# Patient Record
Sex: MALE | Race: WHITE | NOT HISPANIC OR LATINO | Employment: OTHER | URBAN - METROPOLITAN AREA
[De-identification: names, ages, dates, MRNs, and addresses within clinical notes are randomized per-mention and may not be internally consistent; named-entity substitution may affect disease eponyms.]

---

## 2021-01-11 ENCOUNTER — OFFICE VISIT (OUTPATIENT)
Dept: INTERNAL MEDICINE | Facility: CLINIC | Age: 61
End: 2021-01-11

## 2021-01-11 VITALS
BODY MASS INDEX: 21.45 KG/M2 | HEART RATE: 84 BPM | DIASTOLIC BLOOD PRESSURE: 92 MMHG | TEMPERATURE: 96.9 F | SYSTOLIC BLOOD PRESSURE: 130 MMHG | WEIGHT: 158.4 LBS | HEIGHT: 72 IN

## 2021-01-11 DIAGNOSIS — Z13.220 LIPID SCREENING: ICD-10-CM

## 2021-01-11 DIAGNOSIS — Z11.59 NEED FOR HEPATITIS C SCREENING TEST: ICD-10-CM

## 2021-01-11 DIAGNOSIS — R97.20 ELEVATED PSA: ICD-10-CM

## 2021-01-11 DIAGNOSIS — Z86.010 HISTORY OF COLON POLYPS: ICD-10-CM

## 2021-01-11 DIAGNOSIS — E89.0 POSTSURGICAL HYPOTHYROIDISM: Primary | ICD-10-CM

## 2021-01-11 DIAGNOSIS — R03.0 ELEVATED BLOOD PRESSURE READING: ICD-10-CM

## 2021-01-11 DIAGNOSIS — Z85.850 HISTORY OF THYROID CANCER: ICD-10-CM

## 2021-01-11 DIAGNOSIS — Z86.79 HISTORY OF ATRIAL FIBRILLATION: ICD-10-CM

## 2021-01-11 PROBLEM — Z86.0100 HISTORY OF COLON POLYPS: Status: ACTIVE | Noted: 2021-01-11

## 2021-01-11 PROCEDURE — 99204 OFFICE O/P NEW MOD 45 MIN: CPT | Performed by: STUDENT IN AN ORGANIZED HEALTH CARE EDUCATION/TRAINING PROGRAM

## 2021-01-11 RX ORDER — LEVOTHYROXINE SODIUM 88 UG/1
88 TABLET ORAL DAILY
Qty: 90 TABLET | Refills: 3 | Status: SHIPPED | OUTPATIENT
Start: 2021-01-11 | End: 2022-03-21

## 2021-01-11 RX ORDER — LEVOTHYROXINE SODIUM 88 UG/1
88 TABLET ORAL DAILY
COMMUNITY
End: 2021-01-11 | Stop reason: SDUPTHER

## 2021-01-11 RX ORDER — MULTIVIT WITH MINERALS/LUTEIN
250 TABLET ORAL DAILY
COMMUNITY

## 2021-01-11 NOTE — PROGRESS NOTES
History of Present Illness  Salazar Alex is a 60 y.o. male presenting for Elevated PSA (establish care ) and Hypothyroidism.     Patient is here to [facility], recently moved from [place], [place].  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  They have a daughter (born 1990) in [place] who just had their first grandchild in August.  They also have a son (born 1987) in [place], [place].    Patient has a history of mildly elevated PSA, October 2021 5.0, December 2020 at 5.6.  He has establish care with urologist here in [place] and they are talking about doing biopsies.    His mother is still alive in her 80s, had colitis in her younger days and developed colon cancer in her 40s.  For this reason patient has had multiple colonoscopies, most recently about a year ago, which was normal.  I recommend a repeat colonoscopy in 5 years.  On his colonoscopy about 8 years ago he had a couple of small polyps, that were removed.    Patient has a history of right hemithyroidectomy October 2010 for papillary thyroid cancer.  No lymph node involvement or metastasis.  He is on Synthroid 0.88 mcg daily and has been on for years.  He has just been following with his PCP in [place] over the last 5 years with no concern for recurrence.    Patient also has a history of atrial fibrillation, status post ablation December 2018.  He did not have recurrence of his A. fib since.  Not on any medications for this.    The following portions of the patient's history were reviewed and updated as appropriate: allergies, current medications, past family history, past medical history, past social history, past surgical history and problem list.    Review of Systems   Constitutional: Negative for fever.   HENT: Negative for congestion.    Eyes: Negative for visual disturbance.   Respiratory: Negative for shortness of breath.    Cardiovascular: Negative for chest pain.   Gastrointestinal: Negative  "for abdominal pain.   Genitourinary: Negative for dysuria.   Skin: Negative for rash.   Neurological: Negative for weakness.   Psychiatric/Behavioral: Negative for depressed mood.       Objective  /92 (BP Location: Right arm, Patient Position: Sitting, Cuff Size: Adult)   Pulse 84   Temp 96.9 °F (36.1 °C) (Infrared)   Ht 182.9 cm (72\")   Wt 71.8 kg (158 lb 6.4 oz)   BMI 21.48 kg/m²     Physical Exam  Vitals signs reviewed.   Constitutional:       Appearance: Normal appearance.   HENT:      Head: Normocephalic and atraumatic.      Nose: Nose normal. No congestion.      Mouth/Throat:      Mouth: Mucous membranes are moist.   Eyes:      Extraocular Movements: Extraocular movements intact.      Conjunctiva/sclera: Conjunctivae normal.   Neck:      Musculoskeletal: Neck supple.      Comments: No lymphadenopathy of the neck or supraclavicular area.  Left thyroid without nodularities.  Cardiovascular:      Rate and Rhythm: Normal rate and regular rhythm.      Heart sounds: Normal heart sounds. No murmur.   Pulmonary:      Effort: Pulmonary effort is normal.      Breath sounds: Normal breath sounds.   Abdominal:      General: There is no distension.      Palpations: Abdomen is soft. There is no mass.      Tenderness: There is no abdominal tenderness.   Musculoskeletal:      Right lower leg: No edema.      Left lower leg: No edema.   Lymphadenopathy:      Cervical: No cervical adenopathy.   Skin:     General: Skin is warm and dry.   Neurological:      Mental Status: He is alert and oriented to person, place, and time. Mental status is at baseline.   Psychiatric:         Behavior: Behavior normal.         Thought Content: Thought content normal.         Assessment/Plan   1. Postsurgical hypothyroidism  2. History of thyroid cancer  Status post surgery in 2010.  Will check TSH today.  Continue on levothyroxine.  No clinical concern for recurrence.  Recheck TSH every 6 to 12 months depending on results.  - " levothyroxine (Synthroid) 88 MCG tablet; Take 1 tablet by mouth Daily.  Dispense: 90 tablet; Refill: 3  - TSH; Future  - T4, Free; Future      3. Elevated blood pressure reading  BP Readings from Last 3 Encounters:   01/11/21 130/92   Borderline elevated today.  No history of hypertension.  Will recheck on follow-up visit.  - Comprehensive Metabolic Panel; Future    4. History of atrial fibrillation  No palpitations.  Patient is physically active and states he did not need to follow-up with cardiology.  Regular rate today.    5. Elevated PSA  Last PSA 5.6 per patient.  He will follow up with urology.    6. History of colon polyps  Last colonoscopy about a year ago, will attempt to get records.    7. Need for hepatitis C screening test  No history of hepatitis C.  Will collect today as per recommendations.  - Hepatitis C Antibody; Future    8. Lipid screening  Collected today, not fasting.  - Lipid Panel; Future      Return in about 3 months (around 4/11/2021) for Annual physical + recheck BP.    Olegario Lam MD  Family Medicine  01/11/2021    Note: Speech recognition transcription software may have been used to create portions of this document.  An attempt at proofreading has been made but errors in transcription could still be present.

## 2021-01-14 ENCOUNTER — LAB (OUTPATIENT)
Dept: LAB | Facility: HOSPITAL | Age: 61
End: 2021-01-14

## 2021-01-14 DIAGNOSIS — Z11.59 NEED FOR HEPATITIS C SCREENING TEST: ICD-10-CM

## 2021-01-14 DIAGNOSIS — Z13.220 LIPID SCREENING: ICD-10-CM

## 2021-01-14 DIAGNOSIS — E89.0 POSTSURGICAL HYPOTHYROIDISM: ICD-10-CM

## 2021-01-14 DIAGNOSIS — R03.0 ELEVATED BLOOD PRESSURE READING: ICD-10-CM

## 2021-01-14 LAB
ALBUMIN SERPL-MCNC: 4.5 G/DL (ref 3.5–5.2)
ALBUMIN/GLOB SERPL: 1.6 G/DL
ALP SERPL-CCNC: 61 U/L (ref 39–117)
ALT SERPL W P-5'-P-CCNC: 27 U/L (ref 1–41)
ANION GAP SERPL CALCULATED.3IONS-SCNC: 9.1 MMOL/L (ref 5–15)
AST SERPL-CCNC: 26 U/L (ref 1–40)
BILIRUB SERPL-MCNC: 0.7 MG/DL (ref 0–1.2)
BUN SERPL-MCNC: 19 MG/DL (ref 6–20)
BUN/CREAT SERPL: 22.4 (ref 7–25)
CALCIUM SPEC-SCNC: 9.2 MG/DL (ref 8.6–10.5)
CHLORIDE SERPL-SCNC: 104 MMOL/L (ref 98–107)
CHOLEST SERPL-MCNC: 193 MG/DL (ref 0–200)
CO2 SERPL-SCNC: 25.9 MMOL/L (ref 22–29)
CREAT SERPL-MCNC: 0.85 MG/DL (ref 0.76–1.27)
GFR SERPL CREATININE-BSD FRML MDRD: 92 ML/MIN/1.73
GLOBULIN UR ELPH-MCNC: 2.8 GM/DL
GLUCOSE SERPL-MCNC: 88 MG/DL (ref 65–99)
HCV AB SER DONR QL: NORMAL
HDLC SERPL-MCNC: 62 MG/DL (ref 40–60)
LDLC SERPL CALC-MCNC: 121 MG/DL (ref 0–100)
LDLC/HDLC SERPL: 1.93 {RATIO}
POTASSIUM SERPL-SCNC: 4.1 MMOL/L (ref 3.5–5.2)
PROT SERPL-MCNC: 7.3 G/DL (ref 6–8.5)
SODIUM SERPL-SCNC: 139 MMOL/L (ref 136–145)
T4 FREE SERPL-MCNC: 1.41 NG/DL (ref 0.93–1.7)
TRIGL SERPL-MCNC: 56 MG/DL (ref 0–150)
TSH SERPL DL<=0.05 MIU/L-ACNC: 3.63 UIU/ML (ref 0.27–4.2)
VLDLC SERPL-MCNC: 10 MG/DL (ref 5–40)

## 2021-01-14 PROCEDURE — 80053 COMPREHEN METABOLIC PANEL: CPT

## 2021-01-14 PROCEDURE — 84443 ASSAY THYROID STIM HORMONE: CPT

## 2021-01-14 PROCEDURE — 86803 HEPATITIS C AB TEST: CPT

## 2021-01-14 PROCEDURE — 84439 ASSAY OF FREE THYROXINE: CPT

## 2021-01-14 PROCEDURE — 80061 LIPID PANEL: CPT

## 2021-04-27 ENCOUNTER — OFFICE VISIT (OUTPATIENT)
Dept: INTERNAL MEDICINE | Facility: CLINIC | Age: 61
End: 2021-04-27

## 2021-04-27 VITALS
DIASTOLIC BLOOD PRESSURE: 88 MMHG | WEIGHT: 155.6 LBS | HEIGHT: 72 IN | OXYGEN SATURATION: 95 % | SYSTOLIC BLOOD PRESSURE: 122 MMHG | HEART RATE: 64 BPM | BODY MASS INDEX: 21.08 KG/M2 | TEMPERATURE: 98.2 F

## 2021-04-27 DIAGNOSIS — G89.29 CHRONIC PAIN OF RIGHT KNEE: ICD-10-CM

## 2021-04-27 DIAGNOSIS — M25.571 CHRONIC PAIN OF RIGHT ANKLE: ICD-10-CM

## 2021-04-27 DIAGNOSIS — N39.3 STRESS INCONTINENCE OF URINE: ICD-10-CM

## 2021-04-27 DIAGNOSIS — R97.20 ELEVATED PSA: ICD-10-CM

## 2021-04-27 DIAGNOSIS — L85.3 DRY SKIN: ICD-10-CM

## 2021-04-27 DIAGNOSIS — Z00.00 WELL ADULT EXAM: Primary | ICD-10-CM

## 2021-04-27 DIAGNOSIS — G89.29 CHRONIC PAIN OF RIGHT ANKLE: ICD-10-CM

## 2021-04-27 DIAGNOSIS — M25.561 CHRONIC PAIN OF RIGHT KNEE: ICD-10-CM

## 2021-04-27 DIAGNOSIS — Z90.79 H/O ABDOMINAL PROSTATECTOMY: ICD-10-CM

## 2021-04-27 PROBLEM — N40.0 BENIGN PROSTATIC HYPERPLASIA: Status: ACTIVE | Noted: 2019-07-03

## 2021-04-27 PROBLEM — H26.9 CATARACT: Status: ACTIVE | Noted: 2018-05-09

## 2021-04-27 PROCEDURE — 99396 PREV VISIT EST AGE 40-64: CPT | Performed by: STUDENT IN AN ORGANIZED HEALTH CARE EDUCATION/TRAINING PROGRAM

## 2021-04-27 NOTE — PATIENT INSTRUCTIONS
"https://www.nhlbi.nih.gov/files/docs/public/heart/dash_brief.pdf\">   DASH Eating Plan  DASH stands for Dietary Approaches to Stop Hypertension. The DASH eating plan is a healthy eating plan that has been shown to:  · Reduce high blood pressure (hypertension).  · Reduce your risk for type 2 diabetes, heart disease, and stroke.  · Help with weight loss.  What are tips for following this plan?  Reading food labels  · Check food labels for the amount of salt (sodium) per serving. Choose foods with less than 5 percent of the Daily Value of sodium. Generally, foods with less than 300 milligrams (mg) of sodium per serving fit into this eating plan.  · To find whole grains, look for the word \"whole\" as the first word in the ingredient list.  Shopping  · Buy products labeled as \"low-sodium\" or \"no salt added.\"  · Buy fresh foods. Avoid canned foods and pre-made or frozen meals.  Cooking  · Avoid adding salt when cooking. Use salt-free seasonings or herbs instead of table salt or sea salt. Check with your health care provider or pharmacist before using salt substitutes.  · Do not johnson foods. Cook foods using healthy methods such as baking, boiling, grilling, roasting, and broiling instead.  · Cook with heart-healthy oils, such as olive, canola, avocado, soybean, or sunflower oil.  Meal planning    · Eat a balanced diet that includes:  ? 4 or more servings of fruits and 4 or more servings of vegetables each day. Try to fill one-half of your plate with fruits and vegetables.  ? 6-8 servings of whole grains each day.  ? Less than 6 oz (170 g) of lean meat, poultry, or fish each day. A 3-oz (85-g) serving of meat is about the same size as a deck of cards. One egg equals 1 oz (28 g).  ? 2-3 servings of low-fat dairy each day. One serving is 1 cup (237 mL).  ? 1 serving of nuts, seeds, or beans 5 times each week.  ? 2-3 servings of heart-healthy fats. Healthy fats called omega-3 fatty acids are found in foods such as walnuts, " flaxseeds, fortified milks, and eggs. These fats are also found in cold-water fish, such as sardines, salmon, and mackerel.  · Limit how much you eat of:  ? Canned or prepackaged foods.  ? Food that is high in trans fat, such as some fried foods.  ? Food that is high in saturated fat, such as fatty meat.  ? Desserts and other sweets, sugary drinks, and other foods with added sugar.  ? Full-fat dairy products.  · Do not salt foods before eating.  · Do not eat more than 4 egg yolks a week.  · Try to eat at least 2 vegetarian meals a week.  · Eat more home-cooked food and less restaurant, buffet, and fast food.  Lifestyle  · When eating at a restaurant, ask that your food be prepared with less salt or no salt, if possible.  · If you drink alcohol:  ? Limit how much you use to:  § 0-1 drink a day for women who are not pregnant.  § 0-2 drinks a day for men.  ? Be aware of how much alcohol is in your drink. In the [place] one drink equals one 12 oz bottle of beer (355 mL), one 5 oz glass of wine (148 mL), or one 1½ oz glass of hard liquor (44 mL).  General information  · Avoid eating more than 2,300 mg of salt a day. If you have hypertension, you may need to reduce your sodium intake to 1,500 mg a day.  · Work with your health care provider to maintain a healthy body weight or to lose weight. Ask what an ideal weight is for you.  · Get at least 30 minutes of exercise that causes your heart to beat faster (aerobic exercise) most days of the week. Activities may include walking, swimming, or biking.  · Work with your health care provider or dietitian to adjust your eating plan to your individual calorie needs.  What foods should I eat?  Fruits  All fresh, dried, or frozen fruit. Canned fruit in natural juice (without added sugar).  Vegetables  Fresh or frozen vegetables (raw, steamed, roasted, or grilled). Low-sodium or reduced-sodium tomato and vegetable juice. Low-sodium or reduced-sodium tomato sauce and tomato paste.  Low-sodium or reduced-sodium canned vegetables.  Grains  Whole-grain or whole-wheat bread. Whole-grain or whole-wheat pasta. Brown rice. Oatmeal. Quinoa. Bulgur. Whole-grain and low-sodium cereals. Ani bread. Low-fat, low-sodium crackers. Whole-wheat flour tortillas.  Meats and other proteins  Skinless chicken or turkey. Ground chicken or turkey. Pork with fat trimmed off. Fish and seafood. Egg whites. Dried beans, peas, or lentils. Unsalted nuts, nut butters, and seeds. Unsalted canned beans. Lean cuts of beef with fat trimmed off. Low-sodium, lean precooked or cured meat, such as sausages or meat loaves.  Dairy  Low-fat (1%) or fat-free (skim) milk. Reduced-fat, low-fat, or fat-free cheeses. Nonfat, low-sodium ricotta or cottage cheese. Low-fat or nonfat yogurt. Low-fat, low-sodium cheese.  Fats and oils  Soft margarine without trans fats. Vegetable oil. Reduced-fat, low-fat, or light mayonnaise and salad dressings (reduced-sodium). Canola, safflower, olive, avocado, soybean, and sunflower oils. Avocado.  Seasonings and condiments  Herbs. Spices. Seasoning mixes without salt.  Other foods  Unsalted popcorn and pretzels. Fat-free sweets.  The items listed above may not be a complete list of foods and beverages you can eat. Contact a dietitian for more information.  What foods should I avoid?  Fruits  Canned fruit in a light or heavy syrup. Fried fruit. Fruit in cream or butter sauce.  Vegetables  Creamed or fried vegetables. Vegetables in a cheese sauce. Regular canned vegetables (not low-sodium or reduced-sodium). Regular canned tomato sauce and paste (not low-sodium or reduced-sodium). Regular tomato and vegetable juice (not low-sodium or reduced-sodium). Pickles. Olives.  Grains  Baked goods made with fat, such as croissants, muffins, or some breads. Dry pasta or rice meal packs.  Meats and other proteins  Fatty cuts of meat. Ribs. Fried meat. Griffin. Bologna, salami, and other precooked or cured meats, such as  sausages or meat loaves. Fat from the back of a pig (fatback). Bratwurst. Salted nuts and seeds. Canned beans with added salt. Canned or smoked fish. Whole eggs or egg yolks. Chicken or turkey with skin.  Dairy  Whole or 2% milk, cream, and half-and-half. Whole or full-fat cream cheese. Whole-fat or sweetened yogurt. Full-fat cheese. Nondairy creamers. Whipped toppings. Processed cheese and cheese spreads.  Fats and oils  Butter. Stick margarine. Lard. Shortening. Ghee. Griffin fat. Tropical oils, such as coconut, palm kernel, or palm oil.  Seasonings and condiments  Onion salt, garlic salt, seasoned salt, table salt, and sea salt. Worcestershire sauce. Tartar sauce. Barbecue sauce. Teriyaki sauce. Soy sauce, including reduced-sodium. Steak sauce. Canned and packaged gravies. Fish sauce. Oyster sauce. Cocktail sauce. Store-bought horseradish. Ketchup. Mustard. Meat flavorings and tenderizers. Bouillon cubes. Hot sauces. Pre-made or packaged marinades. Pre-made or packaged taco seasonings. Relishes. Regular salad dressings.  Other foods  Salted popcorn and pretzels.  The items listed above may not be a complete list of foods and beverages you should avoid. Contact a dietitian for more information.  Where to find more information  · National Heart, Lung, and Blood [place]: www.nhlbi.nih.gov  · American Heart Association: www.heart.org  · Academy of Nutrition and Dietetics: www.eatright.org  · National Kidney Foundation: www.kidney.org  Summary  · The DASH eating plan is a healthy eating plan that has been shown to reduce high blood pressure (hypertension). It may also reduce your risk for type 2 diabetes, heart disease, and stroke.  · When on the DASH eating plan, aim to eat more fresh fruits and vegetables, whole grains, lean proteins, low-fat dairy, and heart-healthy fats.  · With the DASH eating plan, you should limit salt (sodium) intake to 2,300 mg a day. If you have hypertension, you may need to reduce your  sodium intake to 1,500 mg a day.  · Work with your health care provider or dietitian to adjust your eating plan to your individual calorie needs.  This information is not intended to replace advice given to you by your health care provider. Make sure you discuss any questions you have with your health care provider.  Document Revised: 11/20/2020 Document Reviewed: 11/20/2020  [employer] Patient Education © 2021 [employer]

## 2021-04-27 NOTE — PROGRESS NOTES
"Chief Complaint  Salazar Alex is a 60 y.o. male presenting for Annual Exam (fasting ).     Patient has a past medical history of thyroid cancer status post thyroidectomy, IBS, colon polyp, atrial fibrillation status post ablation, BPH and elevated PSA, and he underwent abdominal laparoscopic prostatectomy 4/2021, biopsy results pending.    History of Present Illness  Patient is here for annual physical.    He had his prostate surgery at [facility] on 4/6/2021.  He has significant daytime urinary incontinence, but he is able to sleep at night without concern for urinary leakage.  He has upcoming appointment with urology and biopsy results are still pending.    He has noticed some dryness of both feet, with mild itching and thickening of the skin of the toes.  He does sweat in his socks and tends to be moist at times.  He does not apply any moisturizers.    He also has noticed some right ankle discomfort over the last 2 years, gets worse when he is active.  No injury.  Additionally he did have a right knee meniscus tear in the past and he had arthroscopy done in 2014 with shaving of his meniscus.  He has noticed some discomfort recently when he is exercising, but no swelling of the joint.    The following portions of the patient's history were reviewed and updated as appropriate: allergies, current medications, past family history, past medical history, past social history, past surgical history and problem list.    Review of Systems   Gastrointestinal: Positive for abdominal pain.   Genitourinary: Positive for dysuria.       Objective  /88 (BP Location: Left arm, Patient Position: Sitting, Cuff Size: Adult)   Pulse 64   Temp 98.2 °F (36.8 °C) (Temporal)   Ht 182.9 cm (72.01\")   Wt 70.6 kg (155 lb 9.6 oz)   SpO2 95%   BMI 21.10 kg/m²     Physical Exam  Vitals reviewed.   Constitutional:       Appearance: Normal appearance. He is normal weight.   HENT:      Head: Normocephalic and atraumatic.      " Nose: No congestion.   Eyes:      Extraocular Movements: Extraocular movements intact.      Conjunctiva/sclera: Conjunctivae normal.   Neck:      Vascular: No carotid bruit.   Cardiovascular:      Rate and Rhythm: Normal rate and regular rhythm.      Heart sounds: Normal heart sounds. No murmur heard.     Pulmonary:      Effort: Pulmonary effort is normal.      Breath sounds: Normal breath sounds.   Abdominal:      General: There is no distension.      Palpations: Abdomen is soft. There is no mass.      Tenderness: There is abdominal tenderness. There is no guarding.      Comments: Well-healing scars on his abdomen.  Mildly tender, no focal tenderness.   Musculoskeletal:         General: No swelling or tenderness.      Cervical back: Neck supple.      Comments: Right knee: Nontender on palpation.  Good ROM.  Nontender on valgus and varus stress, Lachman and drawer test negative.  No crepitus.  Right ankle: Nontender on palpation.  Nontender ligaments.  Good ROM without tenderness or crepitus.   Skin:     General: Skin is warm and dry.      Comments: Dry skin on both feet over the dorsal toes and distal forefoot.   Neurological:      Mental Status: He is alert and oriented to person, place, and time. Mental status is at baseline.   Psychiatric:         Behavior: Behavior normal.         Thought Content: Thought content normal.         Assessment/Plan   1. Well adult exam  Patient's Body mass index is 21.1 kg/m². BMI is within normal parameters. No follow-up required..    2. H/O abdominal prostatectomy  3. Elevated PSA  4. Stress incontinence of urine  Currently having significant incontinence, patient is interested in physical therapy and referral.  Biopsy still pending.  Will likely improve with pelvic floor exercises.  Patient will follow up with urology.  - Ambulatory Referral to Physical Therapy Pelvic Floor    5. Dry skin  Likely caused by moist skin in setting of sweating.  Counseled on use of footwear with  ventilation, avoid walking around and moist or wet feet.  Recommend using moisturizers at night before he goes to bed.    6. Chronic pain of right ankle  Mild, discussed x-ray versus physical therapy.  Patient would like to hold off for now.  Possible degenerative.  Recommend activity, strengthening exercises, can also try balance forward.    7. Chronic pain of right knee  Possibility of OA, could be secondary after his previous knee injury.  Discussed x-ray, he would like to hold off for now.  Recommend continued physical activity, consider physical therapy and x-ray if worsening.      Return in about 4 months (around 8/27/2021) for Recheck.    Future Appointments       Provider Department Center    8/27/2021 9:15 AM Olegario Lam MD [facility] INTERNAL MEDICINE [facility]            Olegario Lam MD  Family Medicine  04/27/2021    Note: Speech recognition transcription software may have been used to create portions of this document.  An attempt at proofreading has been made but errors in transcription could still be present.

## 2021-05-03 ENCOUNTER — TREATMENT (OUTPATIENT)
Dept: PHYSICAL THERAPY | Facility: CLINIC | Age: 61
End: 2021-05-03

## 2021-05-03 DIAGNOSIS — Z90.79 H/O ABDOMINAL PROSTATECTOMY: ICD-10-CM

## 2021-05-03 DIAGNOSIS — N39.3 STRESS INCONTINENCE: Primary | ICD-10-CM

## 2021-05-03 DIAGNOSIS — M62.89 PELVIC FLOOR DYSFUNCTION: ICD-10-CM

## 2021-05-03 PROCEDURE — 97162 PT EVAL MOD COMPLEX 30 MIN: CPT | Performed by: PHYSICAL THERAPIST

## 2021-05-03 PROCEDURE — 97112 NEUROMUSCULAR REEDUCATION: CPT | Performed by: PHYSICAL THERAPIST

## 2021-05-03 NOTE — PROGRESS NOTES
Physical Therapy Initial Evaluation and Plan of Care    Patient: Salazar Alex   : 1960  Diagnosis/ICD-10 Code:  Stress incontinence [N39.3]  Referring practitioner: Olegario Lam MD  Date of Initial Visit: 5/3/2021  Today's Date: 2021  Patient seen for 1 sessions      Subjective    Initially diagnosed last summer. Got delayed because living in [place] at the time and moved here. Was supposed to have biopsy in October but had in 2021. Had surgery 2021. Catheter in for 13 days. Irritated urethra. Having urinary leakage moving around, exercise. In last 48 hours starting to get better. Active person likes to walk and biking. Lying down not leaking and not wearing a pad at night. Leaks more as day goes on.       Chief Complaint:   Chief Complaint   Patient presents with   • Initial Evaluation      Functional Outcome Measure: IIQ:     Pain  Denies pain    Bladder function:    Incontinence: yes  # of pads in 24 hours: Can saturate a pad in an hour. Using 4-5 pads per day.    How soaked is pad when changed: can be 100%  What specifically makes you leak: movement, exercise  Leak at night: no  Urination at night: 1 time  Use bathroom “just in case”: no  Strong urinary urge: no  Leaking with urge: no  Frequency of urination: 6x/day  Discomfort with urination: no      Bowel function:  More urine leakage with bowel movement and occasional constipation; otherwise denies issues.   How many episodes of leakage/month: 0  How many BM's/day: 1-2  [place] Stool Scale Type: 2-4  Discomfort with BM: no  Complete bowel voiding %: 100  Assistance to pass stool: no    Sexual activity  Sexually active: ED      Diagnostics:    PMH:   Past Medical History:   Diagnosis Date   • Adenocarcinoma of prostate (CMS/HCC)     Dec 2020 PSA 5.6. Robot assisted lap prostatectomy 2021. Rolando 4+3=7   • Elevated PSA     Dec 2020 PSA 5.6. Urology follow up   • H/O abdominal prostatectomy 2021    [facility]   • History  of atrial fibrillation     S/p ablation   • History of colon polyps 1/11/2021    Per patient ~2012. Normal colonoscopy 2020 per patient. Records requested   • History of thyroid cancer 2010    R hemithyreoidectomy. No LN or metastasis per patient   • Postsurgical hypothyroidism     R hemithyreoidectomy 2010 - papillary thyroid cancer        Surgical HX:   Past Surgical History:   Procedure Laterality Date   • CARDIAC ABLATION  12/2018    a-fib    in [facility]   • HERNIA REPAIR  03/1966   • PROSTATECTOMY  04/06/2021    Laparoscopic   • THYROIDECTOMY, PARTIAL Right 10/2010    Pappilary thyroid cancer        Meds:   Current Outpatient Medications:   •  levothyroxine (Synthroid) 88 MCG tablet, Take 1 tablet by mouth Daily., Disp: 90 tablet, Rfl: 3  •  vitamin C (ASCORBIC ACID) 250 MG tablet, Take 250 mg by mouth Daily., Disp: , Rfl:   •  vitamin E 100 UNIT capsule, Take 100 Units by mouth Daily., Disp: , Rfl:      Occupation: retired    Activity level/exercise routine: cycling and walking, occasional jogging              Objective      verbal consent obtained for external pelvic exam/treatment with declined need for second person in room    Palpation:   Supine:   Abdominals - minimal substitution  Incisions well healed    External:   Pelvic floor contraction - initially demonstrates moderate substitution of glutes, improved isolation with verbal cues  Sensation: intact B  Anal wink: intact    sEMG: supine avg 0.8, avg 5.4, max 13.3    See flowsheet for details of treatment following evaluation.         Assessment/Plan:     The patient is a 60 y.o. male who presents to physical therapy today for urinary incontinence s/p abdominal prostatectomy. Upon initial evaluation, the patient demonstrates the following impairments: decreased recruitment and endurance of pelvic floor mm with moderate glute substitution. Due to these impairments, the patient is unable to perform daily activities or exercise without urinary  incontinence. The patient would benefit from skilled pelvic PT services to address functional limitations and impairments and to improve patient quality of life.      Goals:   STG's: 4 weeks  · Patient will report using no more than 2-3 pads per day <50% wet for progress toward LTG  · Patient will report > 25% improvement in urinary symptoms  · Patient will be able to perform HEP with minimal verbal cues    LTG's: By discharge  · Patient will report >75% improvement in urinary symptoms   · Patient will be able to eliminate pad usage for improved QOL  · Patient will decrease voiding frequency to once every 3-4 hours  · Patient will be independent with HEP      Plan  Therapy options: will be seen for skilled physical therapy services  Planned modality interventions: TENS, ultrasound, cryotherapy, thermotherapy (hydrocollator packs) and high voltage pulsed current (pain management)  Planned therapy interventions: abdominal trunk stabilization, manual therapy, neuromuscular re-education, body mechanics training, flexibility, functional ROM exercises, gait training, home exercise program, joint mobilization, therapeutic activities, stretching, strengthening, spinal/joint mobilization, soft tissue mobilization and postural training  Pt prognosis: good  Frequency: weekly  Duration in visits: 7  Duration in weeks: 12  Treatment plan discussed with: patient    1230/1315  Timed:         Manual Therapy:    0     mins  05091;     Therapeutic Exercise:    4     mins  91770;     Neuromuscular Jamil:    8    mins  12402;    Therapeutic Activity:     0     mins  18953;     Gait Trainin     mins  68567;     Ultrasound:     0     mins  66287;    Ionto                               0    mins   32194  Self Care                       0     mins   71406  Canalith Repos    0     mins 30915      Un-Timed:  Electrical Stimulation:    0     mins  48039 ( );  Dry Needling     0     mins self-pay  Traction     0     mins  57339  Low Eval     0     Mins  76879  Mod Eval     33     Mins  11469  High Eval                       0     Mins  69862  Re-Eval                           0    mins  95765        Timed Treatment:   12   mins   Total Treatment:     45   mins    PT SIGNATURE:Jovi Pedro PT, DPT  DATE TREATMENT INITIATED: 05/03/2021    Initial Certification  Certification Period: 8/2/2021  I certify that the therapy services are furnished while this patient is under my care.  The services outlined above are required by this patient, and will be reviewed every 90 days.     PHYSICIAN: Olegario Lam MD      DATE: 05/03/2021     Please sign and return via fax to [phone]. Thank you, [facility].

## 2021-05-03 NOTE — PROGRESS NOTES
Physical Therapy Initial Evaluation and Plan of Care    Patient: Salazar Alex   : 1960  Diagnosis/ICD-10 Code:  No primary diagnosis found.  Referring practitioner: Olegario Lam MD  Date of Initial Visit: 5/3/2021  Today's Date: 5/3/2021  Patient seen for Visit count could not be calculated. Make sure you are using a visit which is associated with an episode. sessions      Subjective      Chief Complaint: No chief complaint on file.     Functional Outcome Measure:     Pain  Average:{Pelvic #:68053:::1} Best: {Pelvic #:58717:::1} Worst: {Pelvic #:58146:::1}  Location: ***  Aggs: {AG:::1}    Bladder function:    Incontinence: ***  # of pads in 24 hours: ***   How soaked is pad when changed: ***  What specifically makes you leak: ***  Leak at night: ***  Urination at night: ***  Use bathroom “just in case”: ***  Strong urinary urge: ***  Leaking with urge: ***  Urge triggers: ***  Complete bladder voiding %: ***  Urinary splaying: ***  Post-micturition dribble: ***  Frequency of urination: ***  Discomfort with urination: ***  Vaginal or anal itching: ***  Fluid irritants consumed daily: ***  Food irritants consumed daily: ***    Bowel function:    How many episodes of leakage/month: ***  How many BM's/day: ***  [place] Stool Scale Type: ***  Discomfort with BM: ***  Complete bowel voiding %: ***  Assistance to pass stool: ***  Diet: ***  Incontinence at night: ***  Incontinence with gas: ***  Ability to pass gas: ***    Sexual activity  Sexually active: ***  Pain with penetration: {Pain:53155:::1} Type: *** Location: ***  Pain after sex: {Pain:41302:::1} Type: *** Location: ***  Lubrication: ***  Positions of comfort & discomfort: ***  ED: ***    Prior PT or other treatment: ***    Diagnostics:    PMH:   Past Medical History:   Diagnosis Date   • Adenocarcinoma of prostate (CMS/HCC)     Dec 2020 PSA 5.6. Robot assisted lap prostatectomy 2021. Rolando 4+3=7   • Elevated PSA     Dec 2020 PSA 5.6.  Urology follow up   • H/O abdominal prostatectomy 04/06/2021    [facility]   • History of atrial fibrillation     S/p ablation   • History of colon polyps 1/11/2021    Per patient ~2012. Normal colonoscopy 2020 per patient. Records requested   • History of thyroid cancer 2010    R hemithyreoidectomy. No LN or metastasis per patient   • Postsurgical hypothyroidism     R hemithyreoidectomy 2010 - papillary thyroid cancer        Surgical HX:   Past Surgical History:   Procedure Laterality Date   • CARDIAC ABLATION  12/2018    a-fib    in [facility]   • HERNIA REPAIR  03/1966   • PROSTATECTOMY  04/06/2021    Laparoscopic   • THYROIDECTOMY, PARTIAL Right 10/2010    Pappilary thyroid cancer        Menstrual cycle: ***    Birth HX (#, when, type of delivery, complications): ***    Meds:   Current Outpatient Medications:   •  levothyroxine (Synthroid) 88 MCG tablet, Take 1 tablet by mouth Daily., Disp: 90 tablet, Rfl: 3  •  vitamin C (ASCORBIC ACID) 250 MG tablet, Take 250 mg by mouth Daily., Disp: , Rfl:   •  vitamin E 100 UNIT capsule, Take 100 Units by mouth Daily., Disp: , Rfl:      Occupation: ***    Activity level/exercise routine: ***    Diet/Food intake: ***          Objective      verbal consent obtained for internal pelvic exam/treatment with declined need for second person in room    Palpation:   Supine:   Abdominals, Iliacus - ***  Adductors ***    External:    Ischiocavernosus   Supf and deep transverse perineal mm   Perineal body   Levator ani    Internal:   Sensation: ***  Bulge: ***  Knack: ***  Wall Laxity: ***     L/R supf mm: ***   Internal supf perineal body:***   Levator ani: ***   Obturator internus: ***   Compressor urethra: ***    PERF SCALE:  Power: ***    Endurance: ***    Repetitions: ***    Fast twitch: ***        Assessment/Plan:     The patient is a 60 y.o. male who presents to physical therapy today for ***. Upon initial evaluation, the patient demonstrates the following impairments: ***.  Due to these impairments, the patient is unable to ***. The patient would benefit from skilled pelvic PT services to address functional limitations and impairments and to improve patient quality of life.      Goals:   STG's: *** weeks  · Patient will report > ***% reduction in overall pain   · Patient will report > ***% improvement in urinary symptoms  · Patient will be able to perform HEP with minimal verbal cues    LTG's: By discharge  · Patient will report a decrease in pain to < ***/10  · Patient will report >***% improvement in urinary symptoms   · Patient will report an elimination of urinary urgency   · Patient will report an elimination of nocturia  · Patient will be able to tolerate an internal pelvic exam/vaginal penetration with pain <***/10   · Patient will be able to tolerate sitting >*** minutes without increased pain   · Patient will be able to tolerate standing >*** minutes to increase tolerance to work activities with decreased pain   · Patient will decrease voiding frequency to once every 3-4 hours  · Patient will be independent with HEP      Plan  Therapy options: will be seen for skilled physical therapy services  Planned modality interventions: TENS, ultrasound, cryotherapy, thermotherapy (hydrocollator packs) and high voltage pulsed current (pain management)  Planned therapy interventions: abdominal trunk stabilization, manual therapy, neuromuscular re-education, body mechanics training, flexibility, functional ROM exercises, gait training, home exercise program, joint mobilization, therapeutic activities, stretching, strengthening, spinal/joint mobilization, soft tissue mobilization and postural training  Pt prognosis: ***  Frequency: ***  Duration in visits: ***  Duration in weeks: ***  Treatment plan discussed with: patient      Timed:         Manual Therapy:    ***     mins  03493;     Therapeutic Exercise:    ***     mins  86132;     Neuromuscular Jamil:    ***    mins  17450;     Therapeutic Activity:     ***     mins  95903;     Gait Training:      ***     mins  58152;     Ultrasound:     ***     mins  73612;    Ionto                               ***    mins   16736  Self Care                       ***     mins   15272  Canalith Repos    ***     mins 32703      Un-Timed:  Electrical Stimulation:    ***     mins  03855 ( );  Dry Needling     ***     mins self-pay  Traction     ***     mins 88322  Low Eval     ***     Mins  05465  Mod Eval     ***     Mins  40960  High Eval                       ***     Mins  48218  Re-Eval                           ***    mins  97321        Timed Treatment:   ***   mins   Total Treatment:     ***   mins    PT SIGNATURE:Jovi Pedro PT, DPT  DATE TREATMENT INITIATED: 5/3/2021    {Certification Type:[id number]}  Certification Period: 8/1/2021  I certify that the therapy services are furnished while this patient is under my care.  The services outlined above are required by this patient, and will be reviewed every 90 days.     PHYSICIAN: Olegario Lam MD      DATE: 05/03/2021     Please sign and return via fax to [phone]. Thank you, [facility].

## 2021-05-10 ENCOUNTER — TREATMENT (OUTPATIENT)
Dept: PHYSICAL THERAPY | Facility: CLINIC | Age: 61
End: 2021-05-10

## 2021-05-10 DIAGNOSIS — N39.3 STRESS INCONTINENCE: Primary | ICD-10-CM

## 2021-05-10 DIAGNOSIS — M62.89 PELVIC FLOOR DYSFUNCTION: ICD-10-CM

## 2021-05-10 DIAGNOSIS — Z90.79 H/O ABDOMINAL PROSTATECTOMY: ICD-10-CM

## 2021-05-10 PROCEDURE — 97112 NEUROMUSCULAR REEDUCATION: CPT | Performed by: PHYSICAL THERAPIST

## 2021-05-10 PROCEDURE — 97530 THERAPEUTIC ACTIVITIES: CPT | Performed by: PHYSICAL THERAPIST

## 2021-05-10 NOTE — PROGRESS NOTES
Physical Therapy Daily Progress Note  Patient: Salazar Alex   : 1960  Diagnosis/ICD-10 Code:  Stress incontinence [N39.3]  Referring practitioner: Olegario Lam MD  Date of Initial Visit: Type: THERAPY  Noted: 5/3/2021  Today's Date: 5/10/2021  Patient seen for 2 sessions      Subjective   Salazar Alex reports after BM can feel very sore in rectal area. Nothing really relieves it. Eventually goes away in 10-15 minutes. Not straining with BM. Feeling like getting a little stronger with exercises.     Pain Rating (0-10): 0      Objective   verbal consent obtained for external pelvic exam/treatment with declined need for second person in room     sEMG: supine rest 0.8, avg 7.9, max 14.2  Sitting: rest 0.8, avg 6.0, max 10.1     See Exercise, Manual, and Modality Logs for complete treatment.     Patient Education: squatting for PFM relaxation and squatty potty.     Assessment/Plan  Pt compliant with HEP and demonstrates improving recruitment of PFM with sEMG Today. HEP advanced to include resistance in sitting. He is having PFM pain/discomfort following BM and was instructed in positioning for BM with stool/squatty potty and performing deep squats afterward to reduce discomfort.      Progress per Plan of Care         1400/1440   Timed:         Manual Therapy:    0     mins  16499;     Therapeutic Exercise:    0     mins  36315;     Neuromuscular Jamil:    31    mins  28602;    Therapeutic Activity:     9     mins  02742;     Gait Trainin     mins  46071;     Ultrasound:     0     mins  15626;    Ionto                               0    mins   71042  Self Care                       0     mins   80394  Canalith Repos               0    mins  14968    Un-Timed:  Electrical Stimulation:    0     mins  99048 ( );  Dry Needling     0     mins self-pay  Traction     0     mins 07471  Low Eval     0     Mins  03648  Mod Eval     0     Mins  97198  High Eval                       0     Mins   18241  Re-Eval                           0    mins  61309    Timed Treatment:   40   mins   Total Treatment:     40   mins    Jovi Pedro, PT  [place] License # [id number]  Physical Therapist

## 2021-05-18 ENCOUNTER — TREATMENT (OUTPATIENT)
Dept: PHYSICAL THERAPY | Facility: CLINIC | Age: 61
End: 2021-05-18

## 2021-05-18 DIAGNOSIS — M62.89 PELVIC FLOOR DYSFUNCTION: ICD-10-CM

## 2021-05-18 DIAGNOSIS — Z90.79 H/O ABDOMINAL PROSTATECTOMY: ICD-10-CM

## 2021-05-18 DIAGNOSIS — N39.3 STRESS INCONTINENCE: Primary | ICD-10-CM

## 2021-05-18 PROCEDURE — 97530 THERAPEUTIC ACTIVITIES: CPT | Performed by: PHYSICAL THERAPIST

## 2021-05-18 PROCEDURE — 97112 NEUROMUSCULAR REEDUCATION: CPT | Performed by: PHYSICAL THERAPIST

## 2021-05-18 PROCEDURE — 97110 THERAPEUTIC EXERCISES: CPT | Performed by: PHYSICAL THERAPIST

## 2021-05-18 NOTE — PROGRESS NOTES
Physical Therapy Daily Progress Note  Patient: Salazar Alex   : 1960  Diagnosis/ICD-10 Code:  Stress incontinence [N39.3]  Referring practitioner: Olegario Lam MD  Date of Initial Visit: Type: THERAPY  Noted: 5/3/2021  Today's Date: 2021  Patient seen for 3 sessions      Subjective   Salazar Alex reports doesn't feel much improvement. Noticing leakage more toward end of the day. Feels like things are looser internally from catheter. Says perineal pain is better and has only happened once with walk.     Pain Rating (0-10): 0      Objective   verbal consent obtained for external pelvic exam/treatment with declined need for second person in room     sEMG: supine rest 0.8, avg 8.9, max 15.3  Sitting: rest 0.8, avg 6.0, max 10.1     See Exercise, Manual, and Modality Logs for complete treatment.     Patient Education: affects of abdominal pressure, bladder irritants on leakage    Assessment/Plan  Pt compliant with HEP. He demonstrates increasing recruitment of pelvic floor mm with sEMG today. Pt educated on bladder irritants and role in urinary leakage. HEP advanced to include functional bracing to decrease incontinence. Will continue to progress strengthening as indicated.     Progress per Plan of Care         1000/1040   Timed:         Manual Therapy:    0     mins  16546;     Therapeutic Exercise:    9     mins  95376;     Neuromuscular Jamil:    23    mins  85226;    Therapeutic Activity:     8     mins  66386;     Gait Trainin     mins  97786;     Ultrasound:     0     mins  34811;    Ionto                               0    mins   72508  Self Care                       0     mins   12489  Canalith Repos               0    mins  18512    Un-Timed:  Electrical Stimulation:    0     mins  87760 ( );  Dry Needling     0     mins self-pay  Traction     0     mins 55814  Low Eval     0     Mins  41569  Mod Eval     0     Mins  22211  High Eval                       0     Mins   29024  Re-Eval                           0    mins  07320    Timed Treatment:   40   mins   Total Treatment:     40   mins    Jovi Pedro, PT  [place] License # [id number]  Physical Therapist

## 2021-05-26 ENCOUNTER — TREATMENT (OUTPATIENT)
Dept: PHYSICAL THERAPY | Facility: CLINIC | Age: 61
End: 2021-05-26

## 2021-05-26 DIAGNOSIS — N39.3 STRESS INCONTINENCE: Primary | ICD-10-CM

## 2021-05-26 DIAGNOSIS — M62.89 PELVIC FLOOR DYSFUNCTION: ICD-10-CM

## 2021-05-26 DIAGNOSIS — Z90.79 H/O ABDOMINAL PROSTATECTOMY: ICD-10-CM

## 2021-05-26 PROCEDURE — 97530 THERAPEUTIC ACTIVITIES: CPT | Performed by: PHYSICAL THERAPIST

## 2021-05-26 PROCEDURE — 97112 NEUROMUSCULAR REEDUCATION: CPT | Performed by: PHYSICAL THERAPIST

## 2021-05-26 NOTE — PROGRESS NOTES
Physical Therapy Daily Progress Note  Patient: Salazar Alex   : 1960  Diagnosis/ICD-10 Code:  Stress incontinence [N39.3]  Referring practitioner: Olegario Lam MD  Date of Initial Visit: Type: THERAPY  Noted: 5/3/2021  Today's Date: 2021  Patient seen for 4 sessions      Subjective   Salazar Alex reports doing pelvic floor exercises 3-4 times per day. Feels leaking earlier on days when walking longer distances notices it earlier in afternoon. Typically noticing in evenings. The less he does the later it seems to go before leakage.     Pain Rating (0-10): 0      Objective   verbal consent obtained for external pelvic exam/treatment with declined need for second person in room  Supine: rest 0.7, avg 11.1, max 16.2    See Exercise, Manual, and Modality Logs for complete treatment.     Patient Education: importance of doing HEP as prescribed    Assessment/Plan  Pt with increased leakage sometimes earlier in the afternoon/evening. He reports doing HEP 3-4x/day and is likely working mm to fatigue and experiencing increased leakage due to this. He was instructed in performing no more than 2x/day.     Progress per Plan of Care            Timed:         Manual Therapy:    0     mins  78982;     Therapeutic Exercise:    0     mins  63273;     Neuromuscular Jamil:    32    mins  32836;    Therapeutic Activity:     8     mins  78064;     Gait Trainin     mins  12567;     Ultrasound:     0     mins  27258;    Ionto                               0    mins   69640  Self Care                       0     mins   02091  Canalith Repos               0    mins  93008    Un-Timed:  Electrical Stimulation:    0     mins  21963 ( );  Dry Needling     0     mins self-pay  Traction     0     mins 45804  Low Eval     0     Mins  92243  Mod Eval     0     Mins  07302  High Eval                       0     Mins  08381  Re-Eval                           0    mins  05259    Timed Treatment:   40   mins    Total Treatment:     40   mins    Jovi Pedro, PT  [place] License # [id number]  Physical Therapist

## 2021-06-01 ENCOUNTER — TREATMENT (OUTPATIENT)
Dept: PHYSICAL THERAPY | Facility: CLINIC | Age: 61
End: 2021-06-01

## 2021-06-01 DIAGNOSIS — M62.89 PELVIC FLOOR DYSFUNCTION: ICD-10-CM

## 2021-06-01 DIAGNOSIS — N39.3 STRESS INCONTINENCE: Primary | ICD-10-CM

## 2021-06-01 DIAGNOSIS — Z90.79 H/O ABDOMINAL PROSTATECTOMY: ICD-10-CM

## 2021-06-01 PROCEDURE — 97530 THERAPEUTIC ACTIVITIES: CPT | Performed by: PHYSICAL THERAPIST

## 2021-06-01 PROCEDURE — 97110 THERAPEUTIC EXERCISES: CPT | Performed by: PHYSICAL THERAPIST

## 2021-06-01 PROCEDURE — 97112 NEUROMUSCULAR REEDUCATION: CPT | Performed by: PHYSICAL THERAPIST

## 2021-06-01 NOTE — PROGRESS NOTES
Physical Therapy Daily Progress Note  Patient: Salazar lAex   : 1960  Diagnosis/ICD-10 Code:  Stress incontinence [N39.3]  Referring practitioner: Olegario Lam MD  Date of Initial Visit: Type: THERAPY  Noted: 5/3/2021  Today's Date: 2021  Patient seen for 5 sessions      Subjective   Salazar Alex reports feeling things about the same. Thinking it was fatigue issue but one day didn't walk and still having the same leakage. Noticing the leakage more after lunch and progressively gets worse. None lying down. Drinking more at lunch but will have soda and often does more activity in mornings and drinks more at lunch.   Using 4-5 pads per day now.       Pain Rating (0-10): 0      Objective   verbal consent obtained for external pelvic exam/treatment with declined need for second person in room     Supine: rest 0.7, avg 12.1, max 18.2  Standing: rest 2.2, avg 11.9, max 16.7    See Exercise, Manual, and Modality Logs for complete treatment.       Assessment/Plan  Pt compliant with HEP. He continues to have leakage later in day and was counseled on bladder irritants and pacing of fluid consumption that is occurring at lunch and after. His HEP was also advanced to further challenge endurance to assist with decreasing leakage as day progresses. Will continue to progress strengthening as indicated to reduce incontinence.       Progress per Plan of Care         1430/1515   Timed:         Manual Therapy:    0     mins  37899;     Therapeutic Exercise:    8     mins  11779;     Neuromuscular Jamil:    23    mins  60486;    Therapeutic Activity:     8     mins  45389;     Gait Trainin     mins  95434;     Ultrasound:     0     mins  73927;    Ionto                               0    mins   73543  Self Care                       0     mins   65644  Canalith Repos               0    mins  57828    Un-Timed:  Electrical Stimulation:    0     mins  53210 ( );  Dry Needling     0     mins  self-pay  Traction     0     mins 62218  Low Eval     0     Mins  23360  Mod Eval     0     Mins  77296  High Eval                       0     Mins  70308  Re-Eval                           0    mins  59397    Timed Treatment:   40   mins   Total Treatment:     40   mins    Jovi Pedro PT  [place] License # [id number]  Physical Therapist

## 2021-06-08 ENCOUNTER — TREATMENT (OUTPATIENT)
Dept: PHYSICAL THERAPY | Facility: CLINIC | Age: 61
End: 2021-06-08

## 2021-06-08 DIAGNOSIS — N39.3 STRESS INCONTINENCE: Primary | ICD-10-CM

## 2021-06-08 DIAGNOSIS — M62.89 PELVIC FLOOR DYSFUNCTION: ICD-10-CM

## 2021-06-08 DIAGNOSIS — Z90.79 H/O ABDOMINAL PROSTATECTOMY: ICD-10-CM

## 2021-06-08 PROCEDURE — 97112 NEUROMUSCULAR REEDUCATION: CPT | Performed by: PHYSICAL THERAPIST

## 2021-06-08 PROCEDURE — 97110 THERAPEUTIC EXERCISES: CPT | Performed by: PHYSICAL THERAPIST

## 2021-06-23 ENCOUNTER — TREATMENT (OUTPATIENT)
Dept: PHYSICAL THERAPY | Facility: CLINIC | Age: 61
End: 2021-06-23

## 2021-06-23 DIAGNOSIS — Z90.79 H/O ABDOMINAL PROSTATECTOMY: ICD-10-CM

## 2021-06-23 DIAGNOSIS — N39.3 STRESS INCONTINENCE: Primary | ICD-10-CM

## 2021-06-23 DIAGNOSIS — M62.89 PELVIC FLOOR DYSFUNCTION: ICD-10-CM

## 2021-06-23 PROCEDURE — 97110 THERAPEUTIC EXERCISES: CPT | Performed by: PHYSICAL THERAPIST

## 2021-06-23 PROCEDURE — 97112 NEUROMUSCULAR REEDUCATION: CPT | Performed by: PHYSICAL THERAPIST

## 2021-06-23 NOTE — PROGRESS NOTES
Physical Therapy Daily Progress Note  Patient: Salazar Alex   : 1960  Diagnosis/ICD-10 Code:  Stress incontinence [N39.3]  Referring practitioner: Olegario Lam MD  Date of Initial Visit: Type: THERAPY  Noted: 5/3/2021  Today's Date: 2021  Patient seen for 7 sessions      Subjective   Salazar Alex reports better with avoiding bladder irritants. Artificial sweeteners seem to bother. Using 3-4 pads per day with avoiding irritants.   Eating bland lunch seems to not bother him. Fatigue still in evenings. Exercises pretty good but R knee sore so can't really do squats.   Walked 1.5 hours this morning. Very little leakage with that walk.       Pain Rating (0-10): 0      Objective   verbal consent obtained for internal pelvic exam/treatment with declined need for second person in room     Supine: rest 0.7, avg 10.2, max 18.6    See Exercise, Manual, and Modality Logs for complete treatment.       Assessment/Plan  Improved function with endurance with long holds. This was further progressed today. Not tolerating squat due to knee pain so this was modified. Decreased recruitment today likely due to fatigue with 1.5 hour walk this AM.     Progress per Plan of Care          5   Timed:         Manual Therapy:    0     mins  48008;     Therapeutic Exercise:    25     mins  58189;     Neuromuscular Jamil:    15    mins  60154;    Therapeutic Activity:     0     mins  50564;     Gait Trainin     mins  33876;     Ultrasound:     0     mins  49116;    Ionto                               0    mins   56217  Self Care                       0     mins   86488  Canalith Repos               0    mins  33611    Un-Timed:  Electrical Stimulation:    0     mins  27326 ( );  Dry Needling     0     mins self-pay  Traction     0     mins 18119  Low Eval     0     Mins  61384  Mod Eval     0     Mins  97489  High Eval                       0     Mins  68305  Re-Eval                           0    mins   33267    Timed Treatment:   40   mins   Total Treatment:     40   mins    Jovi Pedro, PT  [place] License # [id number]  Physical Therapist

## 2021-07-07 ENCOUNTER — TREATMENT (OUTPATIENT)
Dept: PHYSICAL THERAPY | Facility: CLINIC | Age: 61
End: 2021-07-07

## 2021-07-07 DIAGNOSIS — Z90.79 H/O ABDOMINAL PROSTATECTOMY: ICD-10-CM

## 2021-07-07 DIAGNOSIS — M62.89 PELVIC FLOOR DYSFUNCTION: ICD-10-CM

## 2021-07-07 DIAGNOSIS — N39.3 STRESS INCONTINENCE: Primary | ICD-10-CM

## 2021-07-07 PROCEDURE — 97110 THERAPEUTIC EXERCISES: CPT | Performed by: PHYSICAL THERAPIST

## 2021-07-07 PROCEDURE — 97112 NEUROMUSCULAR REEDUCATION: CPT | Performed by: PHYSICAL THERAPIST

## 2021-07-07 NOTE — PROGRESS NOTES
Re-Assessment / Re-Certification    Patient: Salazar Alex   : 1960  Diagnosis/ICD-10 Code:  Stress incontinence [N39.3]  Referring practitioner: Olegario Lam MD  Date of Initial Visit: Type: THERAPY  Noted: 5/3/2021  Today's Date: 2021  Patient seen for 8 sessions      Subjective:   Salazar Alex reports: feels a little improvement. Before surgery had slow stream anyway and is still having that issue. If anything it is worse. Sitting on a bike previously was very noticeable. Pads using 2-3 per day. Leakage still related to later in the day. Avoiding bladder irritants especially drinking but food harder to cut out.     Subjective Questionnaire: IIQ:   Clinical Progress: improved  Home Program Compliance: Yes  Treatment has included: therapeutic exercise, neuromuscular re-education and therapeutic activity      Objective   verbal consent obtained for external pelvic exam/treatment with declined need for second person in room    Supine: rest 0.7, avg 10.8, max 14.6  Sittiing: rest 1.1, avg 10.4, max 13.4  Standing rest 2.1, avg 16.3, max 27.7      Assessment/Plan   Pt is making progress, using less pads with overall decreased leakage. He has met 3/3 short term goals and continues to make progress toward LTG. He demonstrates increased recruitment of pelvic floor mm with sEMG. Pt has increased leakage later in the day, likely related to consumption of bladder irritants and this has decreased with decreasing irritants. Pt instructed in advanced HEP today to further improve overall PFM strength and endurance. Will see Pt in one month for follow-up and likely discharge at that time.     Progress toward previous goals: Partially Met    STG's: 4 weeks  · Patient will report using no more than 2-3 pads per day <50% wet for progress toward LTG - met  · Patient will report > 25% improvement in urinary symptoms - met  · Patient will be able to perform HEP with minimal verbal cues - met      LTG's: By  discharge  · Patient will report >75% improvement in urinary symptoms   · Patient will be able to eliminate pad usage for improved QOL  · Patient will decrease voiding frequency to once every 3-4 hours  · Patient will be independent with HEP         Recommendations: See in 1 month for follow up  Timeframe: 1 month  Prognosis to achieve goals: fair    PT Signature: Jovi Pedro PT      Based upon review of the patient's progress and continued therapy plan, it is my medical opinion that Salazar Alex should continue physical therapy treatment at [facility] PHYSICAL THERAPY  [address] [place] [id number] [phone].    Signature: __________________________________  Olegario Lam MD      0927/1007  Manual Therapy:    0     mins  52455;  Therapeutic Exercise:    8     mins  91211;     Neuromuscular Jamil:    32    mins  25744;    Therapeutic Activity:     0     mins  64844;     Gait Trainin     mins  44620;     Ultrasound:     0     mins  31752;    Electrical Stimulation:    0     mins  92688 ( );  Dry Needling     0     mins self-pay    Timed Treatment:   40   mins   Total Treatment:     40   mins

## 2021-08-04 ENCOUNTER — TREATMENT (OUTPATIENT)
Dept: PHYSICAL THERAPY | Facility: CLINIC | Age: 61
End: 2021-08-04

## 2021-08-04 DIAGNOSIS — Z90.79 H/O ABDOMINAL PROSTATECTOMY: ICD-10-CM

## 2021-08-04 DIAGNOSIS — N39.3 STRESS INCONTINENCE: Primary | ICD-10-CM

## 2021-08-04 DIAGNOSIS — M62.89 PELVIC FLOOR DYSFUNCTION: ICD-10-CM

## 2021-08-04 PROCEDURE — 97110 THERAPEUTIC EXERCISES: CPT | Performed by: PHYSICAL THERAPIST

## 2021-08-04 PROCEDURE — 97112 NEUROMUSCULAR REEDUCATION: CPT | Performed by: PHYSICAL THERAPIST

## 2021-08-04 NOTE — PROGRESS NOTES
Re-Assessment / Re-Certification      Patient: Salazar Alex   : 1960  Diagnosis/ICD-10 Code:  Stress incontinence [N39.3]  Referring practitioner: Olegario Lam MD  Date of Initial Visit: Type: THERAPY  Noted: 5/3/2021  Today's Date: 2021  Patient seen for 9 sessions      Subjective:   Salazar Alex reports: 2-3 pads per day and a few drops, more at the end of the day. Dry in AM and after lunch starts dribbling and then more after dinner. MD thinks may have to back in and scope thinks there might be some scar tissue.        Subjective Questionnaire: IIQ: 4  Clinical Progress: improved  Home Program Compliance: Yes  Treatment has included: therapeutic exercise, neuromuscular re-education and therapeutic activity      Objective   verbal consent obtained for internal pelvic exam/treatment with declined need for second person in room  Supine: rest 0.7, avg 11.6, max 18.6    See flowsheet for details of treatment following reassessment.    Assessment/Plan   Pt has had some improvement but continues to have incontinence. He is following up with his urologist to address possible contributing factors. He has had significant improvement in PFM recruitment with SEMG. Pt has been compliant with HEP and was instructed in maintenance HEP today. Pt will be discharged at this time. He has met 3/3 short term goals and 2/4 long term goals.     Progress toward previous goals: Partially Met    STG's: 4 weeks  · Patient will report using no more than 2-3 pads per day <50% wet for progress toward LTG - met  · Patient will report > 25% improvement in urinary symptoms - met  · Patient will be able to perform HEP with minimal verbal cues - met      LTG's: By discharge  · Patient will report >75% improvement in urinary symptoms   · Patient will be able to eliminate pad usage for improved QOL  · Patient will decrease voiding frequency to once every 3-4 hours - met  · Patient will be independent with HEP -  met    Recommendations: Discharge      PT Signature: Jovi Pedro PT      Based upon review of the patient's progress and continued therapy plan, it is my medical opinion that Salazar Alex should continue physical therapy treatment at [facility] PHYSICAL THERAPY  [address] [place] [id number] [phone].    Signature: __________________________________  Olegario Lam MD    0930/    Manual Therapy:    0     mins  95393;  Therapeutic Exercise:    15     mins  16214;     Neuromuscular Jamil:    15    mins  51825;    Therapeutic Activity:     0     mins  69190;     Gait Trainin     mins  42472;     Ultrasound:     0     mins  85662;    Electrical Stimulation:    0     mins  76517 ( );  Dry Needling     0     mins self-pay    Timed Treatment:   30   mins   Total Treatment:     30   mins

## 2021-08-27 ENCOUNTER — OFFICE VISIT (OUTPATIENT)
Dept: INTERNAL MEDICINE | Facility: CLINIC | Age: 61
End: 2021-08-27

## 2021-08-27 ENCOUNTER — HOSPITAL ENCOUNTER (OUTPATIENT)
Dept: GENERAL RADIOLOGY | Facility: HOSPITAL | Age: 61
Discharge: HOME | End: 2021-08-27

## 2021-08-27 ENCOUNTER — LAB (OUTPATIENT)
Dept: LAB | Facility: HOSPITAL | Age: 61
End: 2021-08-27

## 2021-08-27 VITALS
TEMPERATURE: 97.7 F | DIASTOLIC BLOOD PRESSURE: 80 MMHG | BODY MASS INDEX: 21.24 KG/M2 | HEIGHT: 72 IN | OXYGEN SATURATION: 97 % | HEART RATE: 67 BPM | WEIGHT: 156.8 LBS | SYSTOLIC BLOOD PRESSURE: 132 MMHG

## 2021-08-27 DIAGNOSIS — G89.29 CHRONIC PAIN OF RIGHT KNEE: ICD-10-CM

## 2021-08-27 DIAGNOSIS — E89.0 POSTSURGICAL HYPOTHYROIDISM: ICD-10-CM

## 2021-08-27 DIAGNOSIS — N99.114 POSTPROCEDURAL MALE URETHRAL STRICTURE: ICD-10-CM

## 2021-08-27 DIAGNOSIS — M25.561 CHRONIC PAIN OF RIGHT KNEE: ICD-10-CM

## 2021-08-27 DIAGNOSIS — Z85.850 HISTORY OF THYROID CANCER: ICD-10-CM

## 2021-08-27 DIAGNOSIS — N39.3 STRESS INCONTINENCE OF URINE: ICD-10-CM

## 2021-08-27 DIAGNOSIS — Z86.79 HISTORY OF ATRIAL FIBRILLATION: ICD-10-CM

## 2021-08-27 DIAGNOSIS — C61 ADENOCARCINOMA OF PROSTATE (HCC): Primary | ICD-10-CM

## 2021-08-27 PROBLEM — M17.11 OSTEOARTHRITIS OF RIGHT KNEE: Status: ACTIVE | Noted: 2021-08-27

## 2021-08-27 LAB
T4 FREE SERPL-MCNC: 1.43 NG/DL (ref 0.93–1.7)
TSH SERPL DL<=0.05 MIU/L-ACNC: 2.82 UIU/ML (ref 0.27–4.2)

## 2021-08-27 PROCEDURE — 99213 OFFICE O/P EST LOW 20 MIN: CPT | Performed by: STUDENT IN AN ORGANIZED HEALTH CARE EDUCATION/TRAINING PROGRAM

## 2021-08-27 PROCEDURE — 84439 ASSAY OF FREE THYROXINE: CPT

## 2021-08-27 PROCEDURE — 84443 ASSAY THYROID STIM HORMONE: CPT

## 2021-08-27 PROCEDURE — 73560 X-RAY EXAM OF KNEE 1 OR 2: CPT

## 2021-08-27 NOTE — PROGRESS NOTES
"Chief Complaint  Salazar Alex is a 60 y.o. male presenting for Atrial Fibrillation (4 mo. f/u) and Hypothyroidism (postsurgical).     Patient has a past medical history of thyroid cancer status post thyroidectomy, IBS, colon polyp, atrial fibrillation status post ablation, BPH and prostate cancer s/p laparoscopic prostatectomy 4/2021 c/b urethral stricture and mild incontinence.    History of Present Illness  Patient is here for follow-up.  Overall he is doing well.    He was diagnosed with adenocarcinoma of his prostate in April 2021 and underwent laparoscopic prostatectomy.  This was complicated by urinary incontinence, for which he has been undergoing physical therapy.  Additionally he has a urethral stricture with scarring after his surgery, and last week he had a procedure to relieve this.    He is still having some right knee pain, it has gotten better.  He walks about 4 miles, and typically the first few steps are worse.  He would like to have an x-ray done.    Patient also has a history of thyroid cancer with postsurgical hypothyroidism on Synthroid 88 mcg daily.  He has been on remission since his diagnosis about 10 years ago.  No current symptoms.    The following portions of the patient's history were reviewed and updated as appropriate: allergies, current medications, past family history, past medical history, past social history, past surgical history and problem list.    Objective  /80 (BP Location: Left arm, Patient Position: Sitting, Cuff Size: Adult)   Pulse 67   Temp 97.7 °F (36.5 °C) (Temporal)   Ht 182.9 cm (72.01\")   Wt 71.1 kg (156 lb 12.8 oz)   SpO2 97%   BMI 21.26 kg/m²     Physical Exam  Vitals reviewed.   Constitutional:       Appearance: Normal appearance.   HENT:      Head: Normocephalic and atraumatic.      Nose: Nose normal. No congestion.      Mouth/Throat:      Mouth: Mucous membranes are moist.   Eyes:      Extraocular Movements: Extraocular movements intact.      " Conjunctiva/sclera: Conjunctivae normal.   Cardiovascular:      Rate and Rhythm: Normal rate and regular rhythm.      Heart sounds: Normal heart sounds. No murmur heard.     Pulmonary:      Effort: Pulmonary effort is normal.      Breath sounds: Normal breath sounds.   Abdominal:      General: There is no distension.      Palpations: Abdomen is soft. There is no mass.      Tenderness: There is no abdominal tenderness.   Musculoskeletal:      Cervical back: Neck supple.      Right lower leg: No edema.      Left lower leg: No edema.      Comments: Mild crepitus of the right knee.  Good ROM.   Skin:     General: Skin is warm and dry.   Neurological:      Mental Status: He is alert and oriented to person, place, and time. Mental status is at baseline.   Psychiatric:         Behavior: Behavior normal.         Thought Content: Thought content normal.         Assessment/Plan   1. Adenocarcinoma of prostate (CMS/HCC)  2. Postprocedural male urethral stricture  3. Stress incontinence of urine  Overall patient appears to be doing well.  He will continue follow-up with urology.  He is still having some incontinence after his surgery.    4. Chronic pain of right knee  Fairly mild symptoms.  Counseled on continued exercise and movement.  We will do x-ray to see if there is evidence of OA.  - XR Knee 1 or 2 View Right; Future    5. Postsurgical hypothyroidism  6. History of thyroid cancer  No symptoms of constipation or diarrhea, no significant weight change.  Will check levels.  - T4, Free; Future  - TSH; Future    7. History of atrial fibrillation  No recurrence.      Return in about 8 months (around 4/27/2022) for Annual physical.    Future Appointments       Provider Department Center    8/27/2021 10:15 AM [facility] XR 1 [facility] AT [facility]    4/27/2022 8:30 AM Olegario Lam MD [facility] INTERNAL MEDICINE [place] Lam MD  [facility]  Medicine  08/27/2021    Note: Speech recognition transcription software may have been used to create portions of this document.  An attempt at proofreading has been made but errors in transcription could still be present.  Answers for HPI/ROS submitted by the patient on 8/20/2021  What is the primary reason for your visit?: Other  Please describe your symptoms.: This is to establish an annual check-up since I will be 62 YO in a few weeks., This will also serve as follow-up to the prostatectomy that I had in April.  Have you had these symptoms before?: No  How long have you been having these symptoms?: 1-4 days  Please list any medications you are currently taking for this condition.: N/A  Please describe any probable cause for these symptoms. : N/A

## 2021-11-03 ENCOUNTER — PATIENT MESSAGE (OUTPATIENT)
Dept: INTERNAL MEDICINE | Facility: CLINIC | Age: 61
End: 2021-11-03

## 2021-11-04 NOTE — TELEPHONE ENCOUNTER
From: Salazar Alex  To: Olegario Lam MD  Sent: 11/3/2021 7:22 PM EDT  Subject: Immunization Update    Hi, Doctor-  I have the records for my [employer] immunizations, and was wondering how to upload them to [facility], or electronically send them to your office staff. I don't mean to bother you with an operational question like this, but I couldn't find anywhere else to submit this inquiry.  Thanks very much.  Scot

## 2022-01-10 ENCOUNTER — OFFICE VISIT (OUTPATIENT)
Dept: INTERNAL MEDICINE | Facility: CLINIC | Age: 62
End: 2022-01-10

## 2022-01-10 ENCOUNTER — HOSPITAL ENCOUNTER (OUTPATIENT)
Dept: GENERAL RADIOLOGY | Facility: HOSPITAL | Age: 62
Discharge: HOME | End: 2022-01-10

## 2022-01-10 ENCOUNTER — LAB (OUTPATIENT)
Dept: LAB | Facility: HOSPITAL | Age: 62
End: 2022-01-10

## 2022-01-10 VITALS
BODY MASS INDEX: 21.32 KG/M2 | HEIGHT: 72 IN | DIASTOLIC BLOOD PRESSURE: 88 MMHG | HEART RATE: 61 BPM | SYSTOLIC BLOOD PRESSURE: 134 MMHG | WEIGHT: 157.4 LBS | TEMPERATURE: 98 F | OXYGEN SATURATION: 99 %

## 2022-01-10 DIAGNOSIS — S89.91XA RIGHT LEG INJURY, INITIAL ENCOUNTER: Primary | ICD-10-CM

## 2022-01-10 DIAGNOSIS — R03.0 ELEVATED BLOOD PRESSURE READING: ICD-10-CM

## 2022-01-10 DIAGNOSIS — T14.8XXA BRUISING: ICD-10-CM

## 2022-01-10 DIAGNOSIS — M89.8X9 BONE PAIN: ICD-10-CM

## 2022-01-10 DIAGNOSIS — S89.91XA RIGHT LEG INJURY, INITIAL ENCOUNTER: ICD-10-CM

## 2022-01-10 LAB
25(OH)D3 SERPL-MCNC: 24.1 NG/ML (ref 30–100)
DEPRECATED RDW RBC AUTO: 37.7 FL (ref 37–54)
ERYTHROCYTE [DISTWIDTH] IN BLOOD BY AUTOMATED COUNT: 11.9 % (ref 12.3–15.4)
HCT VFR BLD AUTO: 41.9 % (ref 37.5–51)
HGB BLD-MCNC: 14.2 G/DL (ref 13–17.7)
MCH RBC QN AUTO: 29.6 PG (ref 26.6–33)
MCHC RBC AUTO-ENTMCNC: 33.9 G/DL (ref 31.5–35.7)
MCV RBC AUTO: 87.3 FL (ref 79–97)
PLATELET # BLD AUTO: 256 10*3/MM3 (ref 140–450)
PMV BLD AUTO: 11.5 FL (ref 6–12)
RBC # BLD AUTO: 4.8 10*6/MM3 (ref 4.14–5.8)
WBC NRBC COR # BLD: 5.07 10*3/MM3 (ref 3.4–10.8)

## 2022-01-10 PROCEDURE — 99214 OFFICE O/P EST MOD 30 MIN: CPT | Performed by: STUDENT IN AN ORGANIZED HEALTH CARE EDUCATION/TRAINING PROGRAM

## 2022-01-10 PROCEDURE — 85027 COMPLETE CBC AUTOMATED: CPT

## 2022-01-10 PROCEDURE — 82306 VITAMIN D 25 HYDROXY: CPT

## 2022-01-10 PROCEDURE — 73590 X-RAY EXAM OF LOWER LEG: CPT

## 2022-01-10 NOTE — PROGRESS NOTES
"Chief Complaint  Salazar Alex is a 61 y.o. male presenting for possible stress fracture (  X's 5 days ago  lower right tibia  had a stumbled while walking ).     Patient has a past medical history of thyroid cancer status post thyroidectomy, IBS, colon polyp, atrial fibrillation status post ablation, BPH and prostate cancer s/p laparoscopic prostatectomy 4/2021 c/b urethral stricture and mild incontinence.    History of Present Illness  Patient is here due to pain after injury last week.  He was walking in AM on Wednesday 1/5/22, almost stumbled on the  and stepped down hard with his right foot.  Typically walks up to 5-6 miles.  No pain right away, but about 15 minutes after started feeling some pain on the lateral side of the lower leg, above ankle level. Walking more than 1/2 mile now starts causing dull pain from 1-2/10 at baseline, up to 4/10 if walking more. Also noticed mild swelling and tender to touch in this area. No previous injury of foot/ ankle.  Of note patient has not lost any height, he has always been 72 inches / 6 feet tall.    Patient also has noticed some bruising just around where the belt tightens over the anterior iliac crests bilaterally.  He states no bruising anywhere else.    The following portions of the patient's history were reviewed and updated as appropriate: allergies, current medications, past family history, past medical history, past social history, past surgical history and problem list.    Objective  /88 (BP Location: Left arm, Patient Position: Sitting, Cuff Size: Adult)   Pulse 61   Temp 98 °F (36.7 °C) (Temporal)   Ht 182.9 cm (72.01\")   Wt 71.4 kg (157 lb 6.4 oz)   SpO2 99%   BMI 21.34 kg/m²     Physical Exam  Constitutional:       Appearance: Normal appearance.   HENT:      Head: Normocephalic and atraumatic.      Nose: No congestion.   Eyes:      Extraocular Movements: Extraocular movements intact.      Conjunctiva/sclera: Conjunctivae normal. "   Musculoskeletal:         General: Swelling and tenderness present. No deformity.      Right lower leg: No edema.      Left lower leg: No edema.        Legs:    Skin:     Findings: Bruising present.   Neurological:      Mental Status: He is alert and oriented to person, place, and time. Mental status is at baseline.      Motor: No weakness.      Gait: Gait normal.   Psychiatric:         Behavior: Behavior normal.         Thought Content: Thought content normal.         Assessment/Plan   1. Right leg injury, initial encounter  Not adequate trauma, clinically does not appear to be a fracture, but there is mild swelling in the described area of the anterolateral distal tibia, approximately 10 cm above the lateral malleolus.  I recommend doing an x-ray of his leg just to rule out any bony lesion or injury.  We will also check vitamin D.  Could potentially be a small hematoma triggered by him stepping hard down on the ground.  It could potentially be a stress fracture, and the fact that he does walk a lot.  For now I recommend walking distance as tolerated, helps reduce distance a little bit and allow this to heal.  If your symptoms do not improve and resolve within the next 2-3 weeks I recommend he get back in touch.    - XR Tibia Fibula 2 View Right; Future    2. Bruising  No generalized bruising.  Patient walks a lot and his belt/jeans rub against the anterior iliac crest, which I suspect may be the cause of this mild bruising.  We will do CBC to rule out low platelet.  - CBC (No Diff); Future    3. Elevated blood pressure reading  BP Readings from Last 3 Encounters:   01/10/22 134/88   08/27/21 132/80   04/27/21 122/88   Initial blood pressures mildly elevated today, removed shirt that was causing some bulkiness over his upper arm, and repeat was improved and below 140/90.  His father does have a history of hypertension, so patient may be at risk for developing hypertension later on.  We will keep monitoring.    4.  Bone pain  - Vitamin D 25 Hydroxy; Future    Total time spent on chart review, charting and face-to-face with patient 36 minutes.    Return if symptoms worsen or fail to improve, for Next scheduled follow up.    Future Appointments       Provider Department Center    4/27/2022 8:30 AM Olegario Lam MD [facility] INTERNAL MEDICINE [place]          Olegario Lam MD  Family Medicine  01/10/2022    Answers for HPI/ROS submitted by the patient on 1/6/2022  What is the primary reason for your visit?: Other  Please describe your symptoms.: Was on a regular morning walk, and lower right leg started hurting after about 6 miles (this is a normal walking distance for me).  I noticed swelling on the outside of my lower right leg.  Have you had these symptoms before?: No  How long have you been having these symptoms?: 1-4 days  Please list any medications you are currently taking for this condition.: None.  Please describe any probable cause for these symptoms. : Stumbled a bit on the sidewalk, but did not fall.  There was no immediate pain.

## 2022-01-11 PROBLEM — E55.9 VITAMIN D DEFICIENCY: Status: ACTIVE | Noted: 2022-01-11

## 2022-03-21 DIAGNOSIS — E89.0 POSTSURGICAL HYPOTHYROIDISM: ICD-10-CM

## 2022-03-21 RX ORDER — LEVOTHYROXINE SODIUM 88 UG/1
TABLET ORAL
Qty: 90 TABLET | Refills: 3 | Status: SHIPPED | OUTPATIENT
Start: 2022-03-21 | End: 2023-03-16

## 2022-04-27 ENCOUNTER — LAB (OUTPATIENT)
Dept: LAB | Facility: HOSPITAL | Age: 62
End: 2022-04-27

## 2022-04-27 ENCOUNTER — OFFICE VISIT (OUTPATIENT)
Dept: INTERNAL MEDICINE | Facility: CLINIC | Age: 62
End: 2022-04-27

## 2022-04-27 VITALS
SYSTOLIC BLOOD PRESSURE: 130 MMHG | TEMPERATURE: 98 F | DIASTOLIC BLOOD PRESSURE: 88 MMHG | HEIGHT: 72 IN | OXYGEN SATURATION: 98 % | HEART RATE: 59 BPM | WEIGHT: 150 LBS | BODY MASS INDEX: 20.32 KG/M2

## 2022-04-27 DIAGNOSIS — E55.9 VITAMIN D DEFICIENCY: Chronic | ICD-10-CM

## 2022-04-27 DIAGNOSIS — E89.0 POSTSURGICAL HYPOTHYROIDISM: Chronic | ICD-10-CM

## 2022-04-27 DIAGNOSIS — E78.2 MIXED HYPERLIPIDEMIA: Chronic | ICD-10-CM

## 2022-04-27 DIAGNOSIS — Z00.00 WELL ADULT EXAM: Primary | ICD-10-CM

## 2022-04-27 DIAGNOSIS — C61 ADENOCARCINOMA OF PROSTATE: ICD-10-CM

## 2022-04-27 LAB
25(OH)D3 SERPL-MCNC: 32 NG/ML (ref 30–100)
CHOLEST SERPL-MCNC: 184 MG/DL (ref 0–200)
HDLC SERPL-MCNC: 58 MG/DL (ref 40–60)
LDLC SERPL CALC-MCNC: 112 MG/DL (ref 0–100)
LDLC/HDLC SERPL: 1.91 {RATIO}
T4 FREE SERPL-MCNC: 1.58 NG/DL (ref 0.93–1.7)
TRIGL SERPL-MCNC: 77 MG/DL (ref 0–150)
TSH SERPL DL<=0.05 MIU/L-ACNC: 3.46 UIU/ML (ref 0.27–4.2)
VLDLC SERPL-MCNC: 14 MG/DL (ref 5–40)

## 2022-04-27 PROCEDURE — 84439 ASSAY OF FREE THYROXINE: CPT

## 2022-04-27 PROCEDURE — 99396 PREV VISIT EST AGE 40-64: CPT | Performed by: STUDENT IN AN ORGANIZED HEALTH CARE EDUCATION/TRAINING PROGRAM

## 2022-04-27 PROCEDURE — 80061 LIPID PANEL: CPT

## 2022-04-27 PROCEDURE — 84443 ASSAY THYROID STIM HORMONE: CPT

## 2022-04-27 PROCEDURE — 82306 VITAMIN D 25 HYDROXY: CPT

## 2022-04-27 NOTE — PROGRESS NOTES
"Chief Complaint  Salazar Alex is a 61 y.o. male presenting for Annual Exam.     Patient has a past medical history of thyroid cancer (2010) status post thyroidectomy, mild hyperlipidemia, IBS, colon polyp, vitamin D deficiency, atrial fibrillation status post ablation, BPH and prostate cancer s/p laparoscopic prostatectomy 4/2021 c/b urethral stricture and mild incontinence.    History of Present Illness  Patient is here for annual physical.  He is overall doing well.    He did surgery for his prostate cancer 1 year ago and is in remission.  He has follow-up with Dr. Marin in 2 weeks.    Patient is very physically active, he exercises daily about 1 hour with walking, exercise bike, and also 7 days a week.    His appetite is good, he has lost about 6-7 pounds, but feels well.  No loose bowels, no diarrhea.  Patient did have thyroid cancer in 2010 and is stable on Synthroid.    The following portions of the patient's history were reviewed and updated as appropriate: allergies, current medications, past family history, past medical history, past social history, past surgical history and problem list.    Objective  /88 (BP Location: Left arm, Patient Position: Sitting, Cuff Size: Adult)   Pulse 59   Temp 98 °F (36.7 °C) (Temporal)   Ht 182.9 cm (72.01\")   Wt 68 kg (150 lb)   SpO2 98%   BMI 20.34 kg/m²     Physical Exam  Vitals reviewed.   Constitutional:       Appearance: Normal appearance.   HENT:      Head: Normocephalic and atraumatic.      Nose: No congestion.   Eyes:      Extraocular Movements: Extraocular movements intact.      Conjunctiva/sclera: Conjunctivae normal.   Cardiovascular:      Rate and Rhythm: Normal rate and regular rhythm.      Heart sounds: Normal heart sounds. No murmur heard.  Pulmonary:      Effort: Pulmonary effort is normal.      Breath sounds: Normal breath sounds.   Abdominal:      General: There is no distension.      Palpations: Abdomen is soft. There is no mass.      " Tenderness: There is no abdominal tenderness.   Musculoskeletal:      Cervical back: Neck supple.      Right lower leg: No edema.      Left lower leg: No edema.   Skin:     General: Skin is warm and dry.   Neurological:      Mental Status: He is alert and oriented to person, place, and time. Mental status is at baseline.   Psychiatric:         Behavior: Behavior normal.         Thought Content: Thought content normal.         Assessment/Plan   1. Well adult exam  Up-to-date on vaccines.  Needs recommendations on physical exercise.  BMI is within normal parameters. No follow-up required.    2. Postsurgical hypothyroidism  Mild weight loss, but he does exercise a lot.  We will recheck levels.  If stable we can probably do repeat in 1 year, unless symptoms occur which would dictate an earlier check.  - TSH; Future  - T4, Free; Future    3. Mixed hyperlipidemia  The 10-year ASCVD risk score (Hillary QUETA Jr., et al., 2013) is: 7.3%    Values used to calculate the score:      Age: 61 years      Sex: Male      Is Non- : No      Diabetic: No      Tobacco smoker: No      Systolic Blood Pressure: 130 mmHg      Is BP treated: No      HDL Cholesterol: 62 mg/dL      Total Cholesterol: 193 mg/dL  Counseled on recommendations for cholesterol-lowering medication when risk score is over 10%, it is optional when it is 7.5- 10%.  Patient is fasting for repeat lipids today.  - Lipid Panel; Future    4. Vitamin D deficiency  Patient has been taking vitamin D until 2 weeks ago.  He would like to check his levels.  He plans to get some sun exposure to boost his levels.  - Vitamin D 25 Hydroxy; Future    5. Adenocarcinoma of prostate (HCC)  Doing well, continue follow-up with urology.      Return in about 1 year (around 4/27/2023) for Annual physical.    Future Appointments       Provider Department Center    5/1/2023 8:30 AM Olegario Lam MD [facility] INTERNAL MEDICINE [place]            Olegario  MD Genaro  Family Medicine  04/27/2022

## 2022-12-29 ENCOUNTER — OFFICE VISIT (OUTPATIENT)
Dept: INTERNAL MEDICINE | Facility: CLINIC | Age: 62
End: 2022-12-29
Payer: COMMERCIAL

## 2022-12-29 VITALS
DIASTOLIC BLOOD PRESSURE: 82 MMHG | HEART RATE: 68 BPM | SYSTOLIC BLOOD PRESSURE: 118 MMHG | BODY MASS INDEX: 20.45 KG/M2 | WEIGHT: 151 LBS | HEIGHT: 72 IN | TEMPERATURE: 98.7 F

## 2022-12-29 DIAGNOSIS — R05.2 SUBACUTE COUGH: ICD-10-CM

## 2022-12-29 DIAGNOSIS — L85.3 DRY SKIN DERMATITIS: ICD-10-CM

## 2022-12-29 DIAGNOSIS — J30.9 ALLERGIC RHINITIS, UNSPECIFIED SEASONALITY, UNSPECIFIED TRIGGER: ICD-10-CM

## 2022-12-29 DIAGNOSIS — J06.9 UPPER RESPIRATORY TRACT INFECTION, UNSPECIFIED TYPE: Primary | ICD-10-CM

## 2022-12-29 PROCEDURE — 1159F MED LIST DOCD IN RCRD: CPT | Performed by: NURSE PRACTITIONER

## 2022-12-29 PROCEDURE — 1160F RVW MEDS BY RX/DR IN RCRD: CPT | Performed by: NURSE PRACTITIONER

## 2022-12-29 PROCEDURE — 99214 OFFICE O/P EST MOD 30 MIN: CPT | Performed by: NURSE PRACTITIONER

## 2022-12-29 RX ORDER — BENZONATATE 100 MG/1
100 CAPSULE ORAL 3 TIMES DAILY PRN
Qty: 30 CAPSULE | Refills: 0 | Status: SHIPPED | OUTPATIENT
Start: 2022-12-29

## 2022-12-29 RX ORDER — PREDNISONE 10 MG/1
TABLET ORAL
Qty: 21 TABLET | Refills: 0 | Status: SHIPPED | OUTPATIENT
Start: 2022-12-29

## 2022-12-29 RX ORDER — AZITHROMYCIN 250 MG/1
TABLET, FILM COATED ORAL
Qty: 6 TABLET | Refills: 0 | Status: SHIPPED | OUTPATIENT
Start: 2022-12-29

## 2022-12-29 NOTE — PROGRESS NOTES
Follow Up Office Visit      Patient Name: Salazar Alex  : 1960   MRN: [id number]   Care Team: Patient Care Team:  Olegario Lam MD as PCP - General (Family Medicine)  Kd Marin MD as Consulting Physician (Urology)    Chief Complaint:    Chief Complaint   Patient presents with   • Cough     Productive cough x 6 weeks      • Skin Problem     Dry skin       History of Present Illness: Salazar Alex is a 62 y.o. male who is here today for issue of cough that has persisted for approximately 6 weeks.     He reports coughing with phlegm that has appeared to become clear; however, states the cough persists. He notes he took an at-home COVID-19 test, which was negative. He states he typically experiences nasal drainage and night sweats. The patient denies fever and chills. He denies treating his symptoms with any medications.    He denies any tobacco use.     Additionally, he notes experiencing a significant amount of dry skin.     Subjective      Review of Systems:   Review of Systems   Constitutional: Negative for activity change, chills, fatigue, fever and unexpected weight gain.   HENT: Positive for postnasal drip. Negative for dental problem and hearing loss.    Eyes: Negative for visual disturbance.   Respiratory: Positive for cough. Negative for chest tightness and shortness of breath.    Cardiovascular: Negative for chest pain, palpitations and leg swelling.   Gastrointestinal: Negative for abdominal pain, blood in stool, constipation, diarrhea and indigestion.   Genitourinary: Negative for difficulty urinating.   Musculoskeletal: Negative for arthralgias, gait problem and joint swelling.   Skin: Negative for rash.   Neurological: Negative for dizziness, weakness, numbness, headache and memory problem.   Psychiatric/Behavioral: Negative for sleep disturbance and stress.       I have reviewed and the following portions of the patient's history were updated as appropriate: past family  history, past medical history, past social history, past surgical history and problem list.    Medications:     Current Outpatient Medications:   •  levothyroxine (SYNTHROID, LEVOTHROID) 88 MCG tablet, TAKE 1 TABLET DAILY, Disp: 90 tablet, Rfl: 3  •  vitamin C (ASCORBIC ACID) 250 MG tablet, Take 250 mg by mouth Daily., Disp: , Rfl:   •  vitamin E 100 UNIT capsule, Take 100 Units by mouth Daily., Disp: , Rfl:   •  azithromycin (Zithromax Z-Jovan) 250 MG tablet, Take 2 tablets by mouth on day 1, then 1 tablet daily on days 2-5, Disp: 6 tablet, Rfl: 0  •  benzonatate (Tessalon Perles) 100 MG capsule, Take 1 capsule by mouth 3 (Three) Times a Day As Needed for Cough., Disp: 30 capsule, Rfl: 0  •  predniSONE (DELTASONE) 10 MG tablet, Take 6 tablets on day 1, 5 tablets day 2, 4 tablets day 3, 3 tablets day 4, 2 tablets day 5, and 1 tablet day 6, Disp: 21 tablet, Rfl: 0    Allergies:   No Known Allergies    Objective     Physical Exam:  Vital Signs:   Vitals:    12/29/22 1131   BP: 118/82   BP Location: Left arm   Patient Position: Sitting   Cuff Size: Adult   Pulse: 68   Temp: 98.7 °F (37.1 °C)   TempSrc: Temporal   Weight: 68.5 kg (151 lb)   Height: 182.9 cm (72.01\")   PainSc: 0-No pain     Body mass index is 20.47 kg/m².     Physical Exam  Vitals and nursing note reviewed.   Constitutional:       General: He is not in acute distress.  HENT:      Head: Normocephalic and atraumatic.      Right Ear: Tympanic membrane, ear canal and external ear normal.      Left Ear: Tympanic membrane, ear canal and external ear normal.      Mouth/Throat:      Mouth: Mucous membranes are moist.      Comments: Signs of PND on posterior pharynx  Eyes:      General: No scleral icterus.     Conjunctiva/sclera: Conjunctivae normal.   Cardiovascular:      Rate and Rhythm: Regular rhythm.   Pulmonary:      Effort: Pulmonary effort is normal. No respiratory distress.      Breath sounds: No wheezing.      Comments: Frequent dry  cough  Musculoskeletal:      Cervical back: Neck supple. No tenderness.   Lymphadenopathy:      Cervical: No cervical adenopathy.   Skin:     Findings: Erythema present.      Comments: Bilateral forearms with dry skin and associated erythematous appearance   Neurological:      Mental Status: Mental status is at baseline.      Gait: Gait normal.   Psychiatric:         Mood and Affect: Mood normal.         Thought Content: Thought content normal.         Judgment: Judgment normal.         Assessment / Plan      Assessment/Plan:   Problems Addressed This Visit    Persistent cough, suspected URI  -Z-Jovan, take as directed  -Benzonatate 100 mg 3 times daily as needed for cough suppression  -Recommend using humidifier nightly    Allergic Rhinitis  -Start Flonase nasal spray over-the-counter 2 sprays each nostril daily    -Short course of prednisone 10 mg Dosepak x6 days    Dry skin dermatitis  - Advised adequate hydration.   - Recommended utilizing mild bar soaps and avoiding harsher cleanser such as body washes  - Recommended taking lukewarm showers, avoid hot showers  - Advised utilizing a moisturizer such as Cerave.  -Limit caffeine        Plan of care reviewed with patient at the conclusion of today's visit. Education was provided regarding diagnosis and management.  Patient verbalizes understanding of and agreement with management plan.    Follow Up:   Return for Next scheduled follow up.        BARB Das  [facility] [address] [facility]    Please note that portions of this note were completed with a voice recognition program.    Transcribed from ambient dictation for BARB Hartman by Diane Haddad.  12/29/22   12:45 EST    Patient or patient representative verbalized consent to the visit recording.  I have personally performed the services described in this document as transcribed by the above individual, and it is both accurate and complete.

## 2023-03-16 DIAGNOSIS — E89.0 POSTSURGICAL HYPOTHYROIDISM: ICD-10-CM

## 2023-03-16 RX ORDER — LEVOTHYROXINE SODIUM 88 UG/1
TABLET ORAL
Qty: 90 TABLET | Refills: 3 | Status: SHIPPED | OUTPATIENT
Start: 2023-03-16

## 2023-05-01 ENCOUNTER — LAB (OUTPATIENT)
Dept: LAB | Facility: HOSPITAL | Age: 63
End: 2023-05-01
Payer: COMMERCIAL

## 2023-05-01 ENCOUNTER — OFFICE VISIT (OUTPATIENT)
Dept: INTERNAL MEDICINE | Facility: CLINIC | Age: 63
End: 2023-05-01
Payer: COMMERCIAL

## 2023-05-01 VITALS
DIASTOLIC BLOOD PRESSURE: 88 MMHG | HEART RATE: 60 BPM | TEMPERATURE: 98 F | WEIGHT: 149.2 LBS | HEIGHT: 72 IN | BODY MASS INDEX: 20.21 KG/M2 | SYSTOLIC BLOOD PRESSURE: 106 MMHG

## 2023-05-01 DIAGNOSIS — E78.2 MIXED HYPERLIPIDEMIA: Chronic | ICD-10-CM

## 2023-05-01 DIAGNOSIS — Z12.83 SKIN CANCER SCREENING: ICD-10-CM

## 2023-05-01 DIAGNOSIS — C61 ADENOCARCINOMA OF PROSTATE: Chronic | ICD-10-CM

## 2023-05-01 DIAGNOSIS — E89.0 POSTSURGICAL HYPOTHYROIDISM: Chronic | ICD-10-CM

## 2023-05-01 DIAGNOSIS — Z00.00 WELL ADULT EXAM: Primary | ICD-10-CM

## 2023-05-01 DIAGNOSIS — H92.01: ICD-10-CM

## 2023-05-01 DIAGNOSIS — E55.9 VITAMIN D DEFICIENCY: ICD-10-CM

## 2023-05-01 LAB
25(OH)D3 SERPL-MCNC: 31 NG/ML (ref 30–100)
ALBUMIN SERPL-MCNC: 4.6 G/DL (ref 3.5–5.2)
ALBUMIN/GLOB SERPL: 1.7 G/DL
ALP SERPL-CCNC: 66 U/L (ref 39–117)
ALT SERPL W P-5'-P-CCNC: 24 U/L (ref 1–41)
ANION GAP SERPL CALCULATED.3IONS-SCNC: 9.7 MMOL/L (ref 5–15)
AST SERPL-CCNC: 32 U/L (ref 1–40)
BILIRUB SERPL-MCNC: 0.6 MG/DL (ref 0–1.2)
BUN SERPL-MCNC: 15 MG/DL (ref 8–23)
BUN/CREAT SERPL: 15.2 (ref 7–25)
CALCIUM SPEC-SCNC: 9.8 MG/DL (ref 8.6–10.5)
CHLORIDE SERPL-SCNC: 107 MMOL/L (ref 98–107)
CHOLEST SERPL-MCNC: 201 MG/DL (ref 0–200)
CO2 SERPL-SCNC: 27.3 MMOL/L (ref 22–29)
CREAT SERPL-MCNC: 0.99 MG/DL (ref 0.76–1.27)
DEPRECATED RDW RBC AUTO: 39.1 FL (ref 37–54)
EGFRCR SERPLBLD CKD-EPI 2021: 86.7 ML/MIN/1.73
ERYTHROCYTE [DISTWIDTH] IN BLOOD BY AUTOMATED COUNT: 12.4 % (ref 12.3–15.4)
GLOBULIN UR ELPH-MCNC: 2.7 GM/DL
GLUCOSE SERPL-MCNC: 93 MG/DL (ref 65–99)
HCT VFR BLD AUTO: 43.6 % (ref 37.5–51)
HDLC SERPL-MCNC: 66 MG/DL (ref 40–60)
HGB BLD-MCNC: 14.6 G/DL (ref 13–17.7)
LDLC SERPL CALC-MCNC: 124 MG/DL (ref 0–100)
LDLC/HDLC SERPL: 1.87 {RATIO}
MCH RBC QN AUTO: 29.4 PG (ref 26.6–33)
MCHC RBC AUTO-ENTMCNC: 33.5 G/DL (ref 31.5–35.7)
MCV RBC AUTO: 87.7 FL (ref 79–97)
PLATELET # BLD AUTO: 267 10*3/MM3 (ref 140–450)
PMV BLD AUTO: 10.9 FL (ref 6–12)
POTASSIUM SERPL-SCNC: 4.6 MMOL/L (ref 3.5–5.2)
PROT SERPL-MCNC: 7.3 G/DL (ref 6–8.5)
RBC # BLD AUTO: 4.97 10*6/MM3 (ref 4.14–5.8)
SODIUM SERPL-SCNC: 144 MMOL/L (ref 136–145)
T4 FREE SERPL-MCNC: 1.37 NG/DL (ref 0.93–1.7)
TRIGL SERPL-MCNC: 58 MG/DL (ref 0–150)
TSH SERPL DL<=0.05 MIU/L-ACNC: 2.84 UIU/ML (ref 0.27–4.2)
VLDLC SERPL-MCNC: 11 MG/DL (ref 5–40)
WBC NRBC COR # BLD: 6.78 10*3/MM3 (ref 3.4–10.8)

## 2023-05-01 PROCEDURE — 85027 COMPLETE CBC AUTOMATED: CPT

## 2023-05-01 PROCEDURE — 99396 PREV VISIT EST AGE 40-64: CPT | Performed by: STUDENT IN AN ORGANIZED HEALTH CARE EDUCATION/TRAINING PROGRAM

## 2023-05-01 PROCEDURE — 80061 LIPID PANEL: CPT

## 2023-05-01 PROCEDURE — 84443 ASSAY THYROID STIM HORMONE: CPT

## 2023-05-01 PROCEDURE — 84439 ASSAY OF FREE THYROXINE: CPT

## 2023-05-01 PROCEDURE — 80053 COMPREHEN METABOLIC PANEL: CPT

## 2023-05-01 PROCEDURE — 82306 VITAMIN D 25 HYDROXY: CPT

## 2023-05-01 RX ORDER — OMEGA-3S/DHA/EPA/FISH OIL/D3 300MG-1000
400 CAPSULE ORAL DAILY
COMMUNITY

## 2023-05-01 NOTE — PROGRESS NOTES
"Chief Complaint  Salazar Alex is a 62 y.o. male presenting for Annual Exam.     Patient has a past medical history of thyroid cancer (2010) status post thyroidectomy, mild hyperlipidemia, IBS, colon polyp, vitamin D deficiency, atrial fibrillation status post ablation, BPH and prostate cancer s/p laparoscopic prostatectomy 4/2021 c/b urethral stricture and mild incontinence.    History of Present Illness  Patient is here for annual physical.    He is overall doing well.  He continues to follow-up with urology and is still having some urinary leakage.  Also some flow issues.  He has appointment with Dr. Marin next week for follow-up.  He is still in remission for his prostate cancer.    He has been experiencing some right ear discomfort at times, he uses over-the-counter earwax removal liquid and occasionally Q-tips to help remove any earwax.  No pain or discomfort at the moment.  He also uses the showerhead to irrigate at times.    The following portions of the patient's history were reviewed and updated as appropriate: allergies, current medications, past family history, past medical history, past social history, past surgical history and problem list.    Objective  /88 (BP Location: Left arm, Patient Position: Sitting, Cuff Size: Adult)   Pulse 60   Temp 98 °F (36.7 °C) (Temporal)   Ht 183.5 cm (72.24\")   Wt 67.7 kg (149 lb 3.2 oz)   BMI 20.10 kg/m²     Physical Exam  Vitals reviewed.   Constitutional:       Appearance: Normal appearance.   HENT:      Head: Normocephalic and atraumatic.      Right Ear: Tympanic membrane, ear canal and external ear normal. There is no impacted cerumen.      Left Ear: Tympanic membrane, ear canal and external ear normal. There is no impacted cerumen.      Ears:      Comments: On the right side no redness or swelling of the ear canal.  Little bit of dry eyes skin, but no significant irritation.  Nontender when pushing against the tragus, nontender when pulling on the " pinna.  Mastoid bone is nontender.  Scant amount of earwax bilaterally     Nose: No congestion.   Eyes:      Extraocular Movements: Extraocular movements intact.      Conjunctiva/sclera: Conjunctivae normal.   Cardiovascular:      Rate and Rhythm: Normal rate and regular rhythm.      Heart sounds: Normal heart sounds. No murmur heard.  Pulmonary:      Effort: Pulmonary effort is normal.      Breath sounds: Normal breath sounds.   Abdominal:      General: There is no distension.      Palpations: Abdomen is soft. There is no mass.      Tenderness: There is no abdominal tenderness.   Musculoskeletal:      Cervical back: Neck supple. No tenderness.      Right lower leg: No edema.      Left lower leg: No edema.   Lymphadenopathy:      Cervical: No cervical adenopathy.   Skin:     General: Skin is warm and dry.   Neurological:      Mental Status: He is alert and oriented to person, place, and time. Mental status is at baseline.   Psychiatric:         Behavior: Behavior normal.         Thought Content: Thought content normal.         Assessment/Plan   1. Well adult exam  Counseled on recommendations for COVID booster.  Patient declines at this time.    2. Earache symptoms, right  Counseled on avoiding Q-tips and avoiding irrigation with showerhead.  If feeling of blockage I would use eardrops for few days, potentially irrigate with the balloon.  Otherwise there is minimal amounts of cerumen at this time, so I would not recommend any active earwax removal.  No concern for external otitis at this time.  Patient does not swim or keep his head underwater.    3. Mixed hyperlipidemia  Patient is fasting for lipids today  - Lipid Panel; Future    4. Adenocarcinoma of prostate  Overall stable.  Continue follow-up with urology.  - Comprehensive Metabolic Panel; Future  - CBC (No Diff); Future    5. Postsurgical hypothyroidism  In remission since treatment 2010.  We will recheck thyroid and continue on Synthroid  - TSH; Future  -  T4, Free; Future    6. Vitamin D deficiency  Stable.  Patient does not generally not use vitamin D supplement during the summer  - Vitamin D,25-Hydroxy; Future    7. Skin cancer screening  Patient would like skin cancer screening by dermatology.  He has had some changes in the past.  - Ambulatory Referral to Dermatology    BMI is within normal parameters. No other follow-up for BMI required.      Return in about 1 year (around 5/1/2024) for Annual physical.    Future Appointments       Provider Department Center    5/1/2023 9:25 AM [facility] AT [facility]    5/2/2024 8:15 AM Olegario Lam MD [facility] INTERNAL [facility]IC[facility]          Olegario Lam MD  Family Medicine  05/01/2023

## 2023-09-18 ENCOUNTER — TELEPHONE (OUTPATIENT)
Dept: INTERNAL MEDICINE | Facility: CLINIC | Age: 63
End: 2023-09-18

## 2023-09-18 DIAGNOSIS — H93.19 TINNITUS, UNSPECIFIED LATERALITY: Primary | ICD-10-CM

## 2023-09-18 NOTE — TELEPHONE ENCOUNTER
"Caller: Salazar Alex \"Serafin\"    Relationship: Self    Best call back number: [phone]     What is the medical concern/diagnosis: TINNITUS     What specialty or service is being requested: AUDIOLOGY    Any additional details: PATIENT STATED THAT HE HAS BEEN SUFFERING FROM TINNITUS FOR OVER 50 YEARS AND IS REQUESTING A REFERRAL TO SEE AN AUDIOLOGIST TO ADDRESS HIS SYMPTOMS.    HE STATED THAT HE HAS HEARD OF A NEW TREATMENT FOR PEOPLE WHO SUFFER FROM TINNITUS AND IS WANTING TO DISCUSS THIS OPTION.    PLEASE ADVISE  "

## 2023-10-18 ENCOUNTER — TELEPHONE (OUTPATIENT)
Dept: INTERNAL MEDICINE | Facility: CLINIC | Age: 63
End: 2023-10-18

## 2023-10-18 NOTE — TELEPHONE ENCOUNTER
"Caller: Salazar Alex \"Serafin\"    Relationship to patient: Self    Best call back number: [phone]     Date of positive COVID19 test: 10/18/23     COVID19 symptoms: SNEEZING, DRAINAGE, HEADACHE     Additional information or concerns: PATIENT WOULD LIKE TO KNOW IF THERE IS ANYTHING HE CAN BE PRESCRIBED TO HELP WITH THESE SYMPTOMS.     What is the patients preferred pharmacy: [id number] - [place], [place] - [address] [facility] [address] - [phone]  - [phone] FX       "

## 2023-10-18 NOTE — TELEPHONE ENCOUNTER
I recommend over-the-counter Mucinex for congestion.  Ibuprofen and/or Tylenol for aches and pains or fever.  There is medication Paxlovid available that can be prescribed, he would need an appointment for that.  Also it depends on when symptoms started, if he has had symptoms more than 5 days the Paxlovid would not be an option.  I am fully booked tomorrow, so if he wants to come in or do a telehealth visit please see if there is available provider.

## 2023-10-19 ENCOUNTER — TELEMEDICINE (OUTPATIENT)
Dept: INTERNAL MEDICINE | Facility: CLINIC | Age: 63
End: 2023-10-19
Payer: COMMERCIAL

## 2023-10-19 DIAGNOSIS — U07.1 COVID-19 VIRUS INFECTION: Primary | ICD-10-CM

## 2023-10-19 PROCEDURE — 99213 OFFICE O/P EST LOW 20 MIN: CPT | Performed by: STUDENT IN AN ORGANIZED HEALTH CARE EDUCATION/TRAINING PROGRAM

## 2023-10-19 NOTE — PROGRESS NOTES
You have chosen to receive care through a telehealth visit.  Do you consent to use a video/audio connection for your medical care today? Yes     Patient has a past medical history of thyroid cancer (2010) status post thyroidectomy, mild hyperlipidemia, IBS, colon polyp, vitamin D deficiency, atrial fibrillation status post ablation, BPH and prostate cancer s/p laparoscopic prostatectomy 4/2021 c/b urethral stricture and mild incontinence.     Chief Complaint  Salazar Alex is a 63 y.o. male who presents via video-conference for COVID infection    Patient ID confirmed.  Patient located in his home in [place].  MD located in clinic.    History of Present Illness  Patient's wife was just discharged from the hospital with COVID infection.  Patient started developing symptoms 2 days ago on 10/17/2023 with sneezing, postnasal drip, headache.  No fever or chills, minimal cough.  No sore throat.  No shortness of breath.  He did not test positive for COVID the first day, but yesterday had positive COVID test.    The following portions of the patient's history were reviewed and updated as appropriate: allergies, current medications, past family history, past medical history, past social history, past surgical history, and problem list.    Review of Systems    Objective  Vital signs available: No  AAO X3.  Well-appearing on video visit.  Speaking in full sentences.    Assessment/Plan   1. COVID-19 virus infection  First-time COVID infection.  Patient is overall in good health and at low risk for severe illness.  It is an option to treat with Paxlovid to reduce possibility of severe disease, hospitalization and death.  It also can relieve symptoms, but carries the risk of rebound symptoms after the course is completed.  It is still considered beneficial.  Patient is interested, discussed side effects.  Also discussed symptoms of severe COVID including chest pain, difficulties breathing, weakness, confusion or any other  concerns, which should lead to emergency room visit.  - Nirmatrelvir&Ritonavir 300/100 (PAXLOVID) 20 x 150 MG & 10 x 100MG tablet therapy pack tablet; Take 3 tablets by mouth 2 (Two) Times a Day for 5 days.  Dispense: 30 tablet; Refill: 0      Return if symptoms worsen or fail to improve, for Next scheduled follow up.    Future Appointments         Provider Department Center    5/2/2024 8:15 AM Olegario Lam MD [facility] MEDICINE [place]              Olegario Lam MD  Family Medicine  10/19/2023

## 2024-01-18 ENCOUNTER — OFFICE VISIT (OUTPATIENT)
Dept: INTERNAL MEDICINE | Facility: CLINIC | Age: 64
End: 2024-01-18
Payer: COMMERCIAL

## 2024-01-18 VITALS
SYSTOLIC BLOOD PRESSURE: 130 MMHG | HEART RATE: 74 BPM | WEIGHT: 150 LBS | BODY MASS INDEX: 20.32 KG/M2 | TEMPERATURE: 97.8 F | HEIGHT: 72 IN | DIASTOLIC BLOOD PRESSURE: 68 MMHG | OXYGEN SATURATION: 98 %

## 2024-01-18 DIAGNOSIS — E89.0 POSTSURGICAL HYPOTHYROIDISM: Chronic | ICD-10-CM

## 2024-01-18 DIAGNOSIS — C61 ADENOCARCINOMA OF PROSTATE: Chronic | ICD-10-CM

## 2024-01-18 DIAGNOSIS — S90.811A ABRASION OF RIGHT HEEL, INITIAL ENCOUNTER: ICD-10-CM

## 2024-01-18 DIAGNOSIS — Z13.21 ENCOUNTER FOR VITAMIN DEFICIENCY SCREENING: ICD-10-CM

## 2024-01-18 DIAGNOSIS — E78.2 MIXED HYPERLIPIDEMIA: Chronic | ICD-10-CM

## 2024-01-18 DIAGNOSIS — E55.9 VITAMIN D DEFICIENCY: ICD-10-CM

## 2024-01-18 DIAGNOSIS — L29.9 ITCHY SKIN: Primary | ICD-10-CM

## 2024-01-18 RX ORDER — HYDROXYZINE HYDROCHLORIDE 25 MG/1
12.5-5 TABLET, FILM COATED ORAL 3 TIMES DAILY PRN
Qty: 60 TABLET | Refills: 1 | Status: SHIPPED | OUTPATIENT
Start: 2024-01-18

## 2024-01-18 RX ORDER — TRIAMCINOLONE ACETONIDE 0.25 MG/G
1 OINTMENT TOPICAL 2 TIMES DAILY
Qty: 68 G | Refills: 0 | Status: SHIPPED | OUTPATIENT
Start: 2024-01-18

## 2024-01-18 NOTE — PROGRESS NOTES
"Chief Complaint  Salazar Alex is a 63 y.o. male presenting for Itching (Pt reports itching all over his body that started a week ago, with red spots in certain places).     Patient has a past medical history of thyroid cancer (2010) status post thyroidectomy, mild hyperlipidemia, IBS, colon polyp, vitamin D deficiency, atrial fibrillation status post ablation, BPH and prostate cancer s/p laparoscopic prostatectomy 4/2021 c/b urethral stricture and mild incontinence.      History of Present Illness  Patient is here for evaluation of itching of his skin over the last week.  The weather has been cold with temperatures in the single digits.  He does shower every day and wash with soap, does not use very hot water.  He does not scratch when the skin itches.  He has used CeraVe a for moisturizing.  He tried Benadryl, which provided some relief but also made him somewhat tired.  He has not tried regular antihistamines.    He has not been ill recently, overall doing well.  He is physically active and walks a lot.  He also reports itching of his heels, worse on the right side, with thickening of the skin.    The following portions of the patient's history were reviewed and updated as appropriate: allergies, current medications, past family history, past medical history, past social history, past surgical history, and problem list.    Image captured per patient verbal consent:          Objective  /68 (BP Location: Left arm, Patient Position: Sitting, Cuff Size: Adult)   Pulse 74   Temp 97.8 °F (36.6 °C) (Infrared)   Ht 183.5 cm (72.24\")   Wt 68 kg (150 lb)   SpO2 98%   BMI 20.21 kg/m²     Physical Exam  Constitutional:       Appearance: Normal appearance.   HENT:      Head: Normocephalic and atraumatic.   Eyes:      General: No scleral icterus.     Extraocular Movements: Extraocular movements intact.      Conjunctiva/sclera: Conjunctivae normal.   Pulmonary:      Effort: Pulmonary effort is normal. No " respiratory distress.   Musculoskeletal:      Cervical back: Normal range of motion and neck supple.   Skin:     General: Skin is warm and dry.      Coloration: Skin is not jaundiced.      Findings: Rash present.      Comments: See image.  Some mildly raised lesions of the legs, excoriations, some dryness of the skin.  Also reports some itching from the dorsal side of the hands, but no visible lesions at the moment.   Neurological:      Mental Status: He is alert and oriented to person, place, and time. Mental status is at baseline.   Psychiatric:         Behavior: Behavior normal.         Thought Content: Thought content normal.         Assessment/Plan   1. Itchy skin  I suspect the rash and itchy skin is related to daily showering with use of soap, cold weather.  I have counseled him on avoiding soap except on hands and feet, and areas that need to be cleaned, but to avoid soap on extremities if possible.  He can also wash with products containing oil, which can help moisturize.  Also use moisturizer, will also do trial of triamcinolone ointment to be applied for symptom relief.  I would use over-the-counter antihistamine like cetirizine/Zyrtec or Claritin/loratadine or Allegra, Xyzal or similar during daytime.  He can also use Atarax 3 or 4 the night, which makes him tired and can relieve the itching.  Avoid scratching is possible.  If he prefers Benadryl I would not use it together with Atarax.  With any worsening symptoms I recommend he get back in touch.  - hydrOXYzine (ATARAX) 25 MG tablet; Take 0.5-2 tablets by mouth 3 (Three) Times a Day As Needed for Itching.  Dispense: 60 tablet; Refill: 1  - triamcinolone (KENALOG) 0.025 % ointment; Apply 1 application  topically to the appropriate area as directed 2 (Two) Times a Day. Apply to itchy skin BID for 2-3 weeks.  Dispense: 68 g; Refill: 0    2. Abrasion of right heel, initial encounter  He walks a lot, this is likely related to rubbing from his shoe.  Will do  Bactroban ointment to help 3 moisturize and avoid infection.  - mupirocin (BACTROBAN) 2 % ointment; Apply 1 application  topically to the appropriate area as directed 3 (Three) Times a Day. Apply to affected area  Dispense: 30 g; Refill: 1    3. Postsurgical hypothyroidism  Continue on Synthroid.  Will do recheck of thyroid  - TSH; Future  - T4, Free; Future    4. Mixed hyperlipidemia  Patient will return for fasting lipids.  - Lipid Panel; Future    5. Adenocarcinoma of prostate  Stable.  Continues to follow-up with urology Dr. Summers, who monitors his PSA.  Will check CMP and CBC  - Comprehensive Metabolic Panel; Future  - CBC (No Diff); Future    6. Vitamin D deficiency  Continue on vitamin D, will recheck level.  - Vitamin D,25-Hydroxy; Future    7. Encounter for vitamin deficiency screening  - Vitamin B12; Future    BMI is within normal parameters. No other follow-up for BMI required.      Return if symptoms worsen or fail to improve, for Next scheduled follow up.    Future Appointments         Provider Department Center    5/2/2024 8:15 AM Olegario Lam MD [facility] INTERNAL MEDICINE [place]              Olegario Lam MD  Family Medicine  01/18/2024

## 2024-02-26 ENCOUNTER — LAB (OUTPATIENT)
Dept: LAB | Facility: HOSPITAL | Age: 64
End: 2024-02-26
Payer: COMMERCIAL

## 2024-02-26 DIAGNOSIS — Z13.21 ENCOUNTER FOR VITAMIN DEFICIENCY SCREENING: ICD-10-CM

## 2024-02-26 DIAGNOSIS — E89.0 POSTSURGICAL HYPOTHYROIDISM: Chronic | ICD-10-CM

## 2024-02-26 DIAGNOSIS — E55.9 VITAMIN D DEFICIENCY: ICD-10-CM

## 2024-02-26 DIAGNOSIS — C61 ADENOCARCINOMA OF PROSTATE: Chronic | ICD-10-CM

## 2024-02-26 DIAGNOSIS — E78.2 MIXED HYPERLIPIDEMIA: Chronic | ICD-10-CM

## 2024-02-26 LAB
25(OH)D3 SERPL-MCNC: 30.7 NG/ML (ref 30–100)
ALBUMIN SERPL-MCNC: 4.4 G/DL (ref 3.5–5.2)
ALBUMIN/GLOB SERPL: 1.6 G/DL
ALP SERPL-CCNC: 65 U/L (ref 39–117)
ALT SERPL W P-5'-P-CCNC: 20 U/L (ref 1–41)
ANION GAP SERPL CALCULATED.3IONS-SCNC: 12.5 MMOL/L (ref 5–15)
AST SERPL-CCNC: 30 U/L (ref 1–40)
BILIRUB SERPL-MCNC: 0.7 MG/DL (ref 0–1.2)
BUN SERPL-MCNC: 13 MG/DL (ref 8–23)
BUN/CREAT SERPL: 15.3 (ref 7–25)
CALCIUM SPEC-SCNC: 9.3 MG/DL (ref 8.6–10.5)
CHLORIDE SERPL-SCNC: 104 MMOL/L (ref 98–107)
CHOLEST SERPL-MCNC: 195 MG/DL (ref 0–200)
CO2 SERPL-SCNC: 24.5 MMOL/L (ref 22–29)
CREAT SERPL-MCNC: 0.85 MG/DL (ref 0.76–1.27)
DEPRECATED RDW RBC AUTO: 38.4 FL (ref 37–54)
EGFRCR SERPLBLD CKD-EPI 2021: 98.2 ML/MIN/1.73
ERYTHROCYTE [DISTWIDTH] IN BLOOD BY AUTOMATED COUNT: 12.1 % (ref 12.3–15.4)
GLOBULIN UR ELPH-MCNC: 2.7 GM/DL
GLUCOSE SERPL-MCNC: 92 MG/DL (ref 65–99)
HCT VFR BLD AUTO: 42.3 % (ref 37.5–51)
HDLC SERPL-MCNC: 67 MG/DL (ref 40–60)
HGB BLD-MCNC: 14.5 G/DL (ref 13–17.7)
LDLC SERPL CALC-MCNC: 118 MG/DL (ref 0–100)
LDLC/HDLC SERPL: 1.75 {RATIO}
MCH RBC QN AUTO: 30 PG (ref 26.6–33)
MCHC RBC AUTO-ENTMCNC: 34.3 G/DL (ref 31.5–35.7)
MCV RBC AUTO: 87.4 FL (ref 79–97)
PLATELET # BLD AUTO: 296 10*3/MM3 (ref 140–450)
PMV BLD AUTO: 10.7 FL (ref 6–12)
POTASSIUM SERPL-SCNC: 3.9 MMOL/L (ref 3.5–5.2)
PROT SERPL-MCNC: 7.1 G/DL (ref 6–8.5)
RBC # BLD AUTO: 4.84 10*6/MM3 (ref 4.14–5.8)
SODIUM SERPL-SCNC: 141 MMOL/L (ref 136–145)
T4 FREE SERPL-MCNC: 1.42 NG/DL (ref 0.93–1.7)
TRIGL SERPL-MCNC: 54 MG/DL (ref 0–150)
TSH SERPL DL<=0.05 MIU/L-ACNC: 3.72 UIU/ML (ref 0.27–4.2)
VIT B12 BLD-MCNC: 443 PG/ML (ref 211–946)
VLDLC SERPL-MCNC: 10 MG/DL (ref 5–40)
WBC NRBC COR # BLD AUTO: 6.87 10*3/MM3 (ref 3.4–10.8)

## 2024-02-26 PROCEDURE — 80053 COMPREHEN METABOLIC PANEL: CPT

## 2024-02-26 PROCEDURE — 80061 LIPID PANEL: CPT

## 2024-02-26 PROCEDURE — 85027 COMPLETE CBC AUTOMATED: CPT

## 2024-02-26 PROCEDURE — 82607 VITAMIN B-12: CPT

## 2024-02-26 PROCEDURE — 84443 ASSAY THYROID STIM HORMONE: CPT

## 2024-02-26 PROCEDURE — 84439 ASSAY OF FREE THYROXINE: CPT

## 2024-02-26 PROCEDURE — 82306 VITAMIN D 25 HYDROXY: CPT

## 2024-03-11 DIAGNOSIS — E89.0 POSTSURGICAL HYPOTHYROIDISM: ICD-10-CM

## 2024-03-11 RX ORDER — LEVOTHYROXINE SODIUM 88 UG/1
TABLET ORAL
Qty: 90 TABLET | Refills: 3 | Status: SHIPPED | OUTPATIENT
Start: 2024-03-11

## 2024-04-09 NOTE — PROGRESS NOTES
Re-Assessment / Re-Certification      Patient: Slaazar Alex   : 1960  Diagnosis/ICD-10 Code:  Stress incontinence [N39.3]  Referring practitioner: Olegario Lam MD  Date of Initial Visit: Type: THERAPY  Noted: 5/3/2021  Today's Date: 6/10/2021  Patient seen for 6 sessions      Subjective:   Salazar Alex reports: feels mm are getting stronger but doesn't seem to relate to further prevention of leakage. Using 3-6 pads per day, 50% wet.   Improvement rated 30%. No pain. Voiding bladder every 4-5x, every 2-3 hours. No urination at night. Leakage seems to be related to happening more after meals. Patient typically is drinking diet soda at meals.     Subjective Questionnaire: IIQ  Clinical Progress: improved  Home Program Compliance: Yes  Treatment has included: therapeutic exercise, neuromuscular re-education and manual therapy    Objective   verbal consent obtained for internal pelvic exam/treatment with declined need for second person in room    Supine: rest 0.7, avg 18.2, max 29.1  Standing: rest 2.2, avg 15.7, max 21.8    See flowsheet for details of treatment following reassessment.     Assessment/Plan   Pt is making progress, using less pads with overall decreased leakage. He has met 2/3 short term goals and continues to make progress toward LTG. He demonstrates increased recruitment of pelvic floor mm with sEMG. Pt has increased leakage later in the day, initially thought to be related to endurance but has seen minimal change with endurance training. He is consuming bladder irritants at lunch and dinner and was instructed in cutting these out over next 2 weeks to determine if this is primary factor. Pt instructed in advanced HEP today to further improve overall PFM strength and endurance. Will see Pt every 2 weeks at this time.     Progress toward previous goals: Partially Met    Goals:   STG's: 4 weeks  · Patient will report using no more than 2-3 pads per day <50% wet for progress toward  LTG  · Patient will report > 25% improvement in urinary symptoms - met   · Patient will be able to perform HEP with minimal verbal cues - met     LTG's: By discharge  · Patient will report >75% improvement in urinary symptoms   · Patient will be able to eliminate pad usage for improved QOL  · Patient will decrease voiding frequency to once every 3-4 hours  · Patient will be independent with HEP      Recommendations: Continue as planned  Timeframe: 2 months  Prognosis to achieve goals: good    PT Signature: Jovi Pdero, PT      Based upon review of the patient's progress and continued therapy plan, it is my medical opinion that Salazar Alex should continue physical therapy treatment at [facility]  [address] [place] [id number] [phone].    Signature: __________________________________  Olegario Lam MD    1000/1040  Manual Therapy:    0     mins  55375;  Therapeutic Exercise:    25     mins  65252;     Neuromuscular Jamil:    15    mins  45248;    Therapeutic Activity:     0     mins  54670;     Gait Trainin     mins  64801;     Ultrasound:     0     mins  07342;    Electrical Stimulation:    0     mins  93368 ( );  Dry Needling     0     mins self-pay    Timed Treatment:   40   mins   Total Treatment:     40   mins                         What Type Of Note Output Would You Prefer (Optional)?: Standard Output Hpi Title: Evaluation of Skin Lesions Year Removed: 2015

## 2024-05-02 ENCOUNTER — OFFICE VISIT (OUTPATIENT)
Dept: INTERNAL MEDICINE | Facility: CLINIC | Age: 64
End: 2024-05-02
Payer: COMMERCIAL

## 2024-05-02 ENCOUNTER — HOSPITAL ENCOUNTER (OUTPATIENT)
Dept: GENERAL RADIOLOGY | Facility: HOSPITAL | Age: 64
Discharge: HOME | End: 2024-05-02
Payer: COMMERCIAL

## 2024-05-02 ENCOUNTER — LAB (OUTPATIENT)
Dept: LAB | Facility: HOSPITAL | Age: 64
End: 2024-05-02
Payer: COMMERCIAL

## 2024-05-02 VITALS
TEMPERATURE: 97.8 F | SYSTOLIC BLOOD PRESSURE: 110 MMHG | DIASTOLIC BLOOD PRESSURE: 88 MMHG | HEART RATE: 64 BPM | BODY MASS INDEX: 20.33 KG/M2 | WEIGHT: 145.2 LBS | HEIGHT: 71 IN

## 2024-05-02 DIAGNOSIS — M25.571 ACUTE RIGHT ANKLE PAIN: ICD-10-CM

## 2024-05-02 DIAGNOSIS — C61 ADENOCARCINOMA OF PROSTATE: ICD-10-CM

## 2024-05-02 DIAGNOSIS — E89.0 POSTSURGICAL HYPOTHYROIDISM: Chronic | ICD-10-CM

## 2024-05-02 DIAGNOSIS — G89.29 CHRONIC RIGHT-SIDED LOW BACK PAIN, UNSPECIFIED WHETHER SCIATICA PRESENT: ICD-10-CM

## 2024-05-02 DIAGNOSIS — E78.2 MIXED HYPERLIPIDEMIA: Chronic | ICD-10-CM

## 2024-05-02 DIAGNOSIS — H60.311 DIFFUSE OTITIS EXTERNA OF RIGHT EAR, UNSPECIFIED CHRONICITY: ICD-10-CM

## 2024-05-02 DIAGNOSIS — M54.50 CHRONIC RIGHT-SIDED LOW BACK PAIN, UNSPECIFIED WHETHER SCIATICA PRESENT: ICD-10-CM

## 2024-05-02 DIAGNOSIS — Z00.00 WELL ADULT EXAM: Primary | ICD-10-CM

## 2024-05-02 DIAGNOSIS — Z85.850 HISTORY OF THYROID CANCER: ICD-10-CM

## 2024-05-02 LAB
ALBUMIN SERPL-MCNC: 4.4 G/DL (ref 3.5–5.2)
ALBUMIN/GLOB SERPL: 1.6 G/DL
ALP SERPL-CCNC: 70 U/L (ref 39–117)
ALT SERPL W P-5'-P-CCNC: 21 U/L (ref 1–41)
ANION GAP SERPL CALCULATED.3IONS-SCNC: 11.2 MMOL/L (ref 5–15)
AST SERPL-CCNC: 27 U/L (ref 1–40)
BILIRUB SERPL-MCNC: 0.7 MG/DL (ref 0–1.2)
BUN SERPL-MCNC: 15 MG/DL (ref 8–23)
BUN/CREAT SERPL: 16.9 (ref 7–25)
CALCIUM SPEC-SCNC: 9.7 MG/DL (ref 8.6–10.5)
CHLORIDE SERPL-SCNC: 105 MMOL/L (ref 98–107)
CHOLEST SERPL-MCNC: 209 MG/DL (ref 0–200)
CO2 SERPL-SCNC: 25.8 MMOL/L (ref 22–29)
CREAT SERPL-MCNC: 0.89 MG/DL (ref 0.76–1.27)
DEPRECATED RDW RBC AUTO: 38.9 FL (ref 37–54)
EGFRCR SERPLBLD CKD-EPI 2021: 96.9 ML/MIN/1.73
ERYTHROCYTE [DISTWIDTH] IN BLOOD BY AUTOMATED COUNT: 12.3 % (ref 12.3–15.4)
GLOBULIN UR ELPH-MCNC: 2.8 GM/DL
GLUCOSE SERPL-MCNC: 86 MG/DL (ref 65–99)
HCT VFR BLD AUTO: 44.1 % (ref 37.5–51)
HDLC SERPL-MCNC: 68 MG/DL (ref 40–60)
HGB BLD-MCNC: 14.8 G/DL (ref 13–17.7)
LDLC SERPL CALC-MCNC: 131 MG/DL (ref 0–100)
LDLC/HDLC SERPL: 1.91 {RATIO}
MCH RBC QN AUTO: 29.6 PG (ref 26.6–33)
MCHC RBC AUTO-ENTMCNC: 33.6 G/DL (ref 31.5–35.7)
MCV RBC AUTO: 88.2 FL (ref 79–97)
PLATELET # BLD AUTO: 287 10*3/MM3 (ref 140–450)
PMV BLD AUTO: 10.4 FL (ref 6–12)
POTASSIUM SERPL-SCNC: 4.4 MMOL/L (ref 3.5–5.2)
PROT SERPL-MCNC: 7.2 G/DL (ref 6–8.5)
RBC # BLD AUTO: 5 10*6/MM3 (ref 4.14–5.8)
SODIUM SERPL-SCNC: 142 MMOL/L (ref 136–145)
T4 FREE SERPL-MCNC: 1.45 NG/DL (ref 0.93–1.7)
TRIGL SERPL-MCNC: 57 MG/DL (ref 0–150)
TSH SERPL DL<=0.05 MIU/L-ACNC: 3.39 UIU/ML (ref 0.27–4.2)
VLDLC SERPL-MCNC: 10 MG/DL (ref 5–40)
WBC NRBC COR # BLD AUTO: 8.52 10*3/MM3 (ref 3.4–10.8)

## 2024-05-02 PROCEDURE — 99214 OFFICE O/P EST MOD 30 MIN: CPT | Performed by: STUDENT IN AN ORGANIZED HEALTH CARE EDUCATION/TRAINING PROGRAM

## 2024-05-02 PROCEDURE — 84439 ASSAY OF FREE THYROXINE: CPT

## 2024-05-02 PROCEDURE — 85027 COMPLETE CBC AUTOMATED: CPT

## 2024-05-02 PROCEDURE — 84443 ASSAY THYROID STIM HORMONE: CPT

## 2024-05-02 PROCEDURE — 80053 COMPREHEN METABOLIC PANEL: CPT

## 2024-05-02 PROCEDURE — 99396 PREV VISIT EST AGE 40-64: CPT | Performed by: STUDENT IN AN ORGANIZED HEALTH CARE EDUCATION/TRAINING PROGRAM

## 2024-05-02 PROCEDURE — 80061 LIPID PANEL: CPT

## 2024-05-02 PROCEDURE — 73610 X-RAY EXAM OF ANKLE: CPT

## 2024-05-02 NOTE — PROGRESS NOTES
Chief Complaint  Salazar Alex is a 63 y.o. male presenting for Annual Exam and Rash.     Patient has a past medical history of thyroid cancer (2010) status post thyroidectomy, mild hyperlipidemia, IBS, colon polyp, vitamin D deficiency, atrial fibrillation status post ablation, BPH and prostate cancer s/p laparoscopic prostatectomy 4/2021 c/b urethral stricture and mild incontinence.      History of Present Illness  Patient is here for annual physical and also has additional questions and concerns.    He continues to follow-up with urology annually, his PSA still nondetectable.    He did see dermatology last year for skin check.  He continues to experience rash of his ankles.  He has tried antifungal cream and also the steroid I gave him.  It has improved, but he is still having issues.    Patient is reporting pain in tenderness over a bony part of the right ankle, just in front of the medial malleolus.  He was wearing a boot too tightly and afterwards it started causing pain and discomfort.  He has avoided continued use of tight boot to avoid any pressure.    He also reports irritation of his right ear.  He uses Q-tips, he also has used instruments and eardrops, and he irrigates sometimes with the showerhead.  No symptoms of the left side.    Patient also reports 6 months of mild right lower back pain shooting down towards the right buttock area, no symptoms radiating to the thigh or below the knee.  No weakness.  No numbness or tingling.  This is occasional, more than when he gets up from a chair and resolves quickly.    He is otherwise doing well.  He is very physically active and exercises 1-1.5 hours every day, 7 days a week.  This morning he walked 7 miles.    He goes to the dentist twice yearly.    The following portions of the patient's history were reviewed and updated as appropriate: allergies, current medications, past family history, past medical history, past social history, past surgical history,  "and problem list.    Image captured per patient verbal consent,          Objective  /88 (BP Location: Left arm, Patient Position: Sitting, Cuff Size: Adult)   Pulse 64   Temp 97.8 °F (36.6 °C) (Temporal)   Ht 181 cm (71.26\")   Wt 65.9 kg (145 lb 3.2 oz)   BMI 20.10 kg/m²     Physical Exam  Vitals reviewed.   Constitutional:       Appearance: Normal appearance.   HENT:      Head: Normocephalic and atraumatic.      Right Ear: Tympanic membrane and external ear normal. There is no impacted cerumen.      Left Ear: Tympanic membrane, ear canal and external ear normal. There is no impacted cerumen.      Ears:      Comments: Right side mild irritation of the ear canal, scant amount of earwax.  No pain with pressure against the tragus or pulling on the pinna.  Mastoid is nontender.  No lymphadenopathy.     Nose: Nose normal. No congestion.      Mouth/Throat:      Mouth: Mucous membranes are moist.   Eyes:      Extraocular Movements: Extraocular movements intact.      Conjunctiva/sclera: Conjunctivae normal.   Cardiovascular:      Rate and Rhythm: Normal rate and regular rhythm.      Heart sounds: Normal heart sounds. No murmur heard.  Pulmonary:      Effort: Pulmonary effort is normal.      Breath sounds: Normal breath sounds.   Abdominal:      General: There is no distension.      Palpations: Abdomen is soft. There is no mass.      Tenderness: There is no abdominal tenderness.   Musculoskeletal:      Cervical back: Neck supple.      Right lower leg: No edema.      Left lower leg: No edema.        Feet:       Comments: Good ROM of both hips on 90 degree flexion, no pain on exam   Skin:     General: Skin is warm and dry.   Neurological:      Mental Status: He is alert and oriented to person, place, and time. Mental status is at baseline.      Comments: Raised leg test negative bilaterally to 85 degrees without any radiating symptoms.   Psychiatric:         Behavior: Behavior normal.         Thought Content: " Thought content normal.         Assessment/Plan   1. Well adult exam  Counseled recommendations for annual flu shot, tetanus/Tdap vaccine every 10 years, COVID booster and RSV vaccine.  Patient declines vaccines today.  Counseled on recommendations for regular dental visits, patient goes twice yearly.  Counseled on recommendations for moderate intensity exercise 2.5 hours weekly, patient exceeds recommendations by far.  BMI is within normal parameters. No other follow-up for BMI required.    2. Postsurgical hypothyroidism  History of thyroid cancer.  Will recheck levels.  - TSH; Future  - T4, Free; Future    3. Mixed hyperlipidemia  The 10-year ASCVD risk score (Rusty LAND, et al., 2019) is: 6.3%    Values used to calculate the score:      Age: 63 years      Sex: Male      Is Non- : No      Diabetic: No      Tobacco smoker: No      Systolic Blood Pressure: 110 mmHg      Is BP treated: No      HDL Cholesterol: 67 mg/dL      Total Cholesterol: 195 mg/dL  Patient is fasting for recheck today.  No indication for cholesterol-lowering medication at this time.  - Lipid Panel; Future  - Comprehensive Metabolic Panel; Future    4. Adenocarcinoma of prostate  Doing well in remission, ND.  Will check CBC.  Continue follow-up with urology  - CBC (No Diff); Future    5. Chronic right-sided low back pain, unspecified whether sciatica present  Fairly mild symptoms currently.  Suspect some degree of arthritis.  For now recommend continuing activity and exercises as tolerated.  With any worsening symptoms consider workup with imaging, potentially physical therapy.    6. Acute right ankle pain  Patient is very physically active, good.  Because of some degree of callus just in front of his right medial malleolus.  Will do x-ray to rule out any bony abnormality.  Otherwise we will continue to monitor.  If worsening symptoms I can refer him to Ortho.  - XR Ankle 3+ View Right; Future    7. Diffuse otitis externa  of right ear, unspecified chronicity  Likely from using Q-tips, instruments and irrigation.  Recommend using only Moehle drops like sweet all, mineral oil, Debrox or similar only drops available over-the-counter.  If she feels clogging of the ear he can come in for an evaluation and we can potentially irrigate.  Recommend avoiding Q-tips, instrumentation and irrigation.    8. History of thyroid cancer  In remission.  Continue on thyroid medication.          Return in about 1 year (around 5/2/2025), or if symptoms worsen or fail to improve, for Annual physical.    Future Appointments         Provider Department Center    5/2/2024 9:45 AM [facility]    5/6/2025 8:30 AM Olegario Lam MD [facility]              Olegario Lam MD  Family Medicine  05/02/2024

## 2024-10-02 ENCOUNTER — OFFICE VISIT (OUTPATIENT)
Dept: PSYCHIATRY | Facility: CLINIC | Age: 64
End: 2024-10-02
Payer: COMMERCIAL

## 2024-10-02 DIAGNOSIS — R53.83 CAREGIVER WITH FATIGUE: ICD-10-CM

## 2024-10-02 DIAGNOSIS — F43.0 STRESS REACTION: Primary | ICD-10-CM

## 2024-10-02 NOTE — PROGRESS NOTES
Subjective   Salazar Alex is a  64 y.o. male who is here today for initial appointment in person. Patient was referred by: Pamela Jeronimo oncology social worker because patient's wife has very challenging health challenges and currently in active breast cancer treatment with chemotherapy. He and his wife live in [place] and he is her caregiver. Patient history of thyroid cancer in 2010 and prostate cancer in 2021 soon after he retired and moved with his wife to [place], [place].    Chief Complaint:  stress , decreased mood    History of Present Illness  Patient presents by himself for this session.The patient reports the following symptoms of stress: on edge, difficulty concentrating from interruption and mood, irritability, easily frustrated and muscle tension. The symptoms have been present for at least the past year or more and have caused impairment in important areas of functioning.  He reports added stress as care giver for his wife who is now going through chemotherapy which he can't understand why she is putting herself through it. He reports having to be available to her almost 24/7 for years. his wife has diabetes and required a BKA years ago and fell a couple years ago and now has excessive pain in her back and is in pain management on oral pain pills five times a day. They have gone through many medication trials and when breast cancer treatment is over she will get a pain pump placed. He is anxious for this to work for her. Over the past year she has been bedridden because of the pain and lack of motivation to help herself. He reports they have slept in separate bedrooms for years. His sleep is disrupted by wife awakening him to care for her or to get her something. He feels his world closing in as he has to do more and more for her. He does all the cleaning, cooking, running of the house and yard. He likes to go out early like 5 am and take walks, long walks that will take 2 hours  sometimes. Patient is very thin. He rides a stationary bike in his home. He is finding it harder to enjoy life. He does enjoy the grandchildren and his adult children who live in town and he'll go over their twice a week to visit or help care for grandchildren and soon a third one will be born. He reports his wife is very ego centric so difficult to enjoy family time. He's concerned about her self made isolation because of her lack of interest to get out, or do her PT  to strengthen. Patient denies PTSD or ky, or panic. He reports more frequent frustrations and difficulty with the situation. 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 He connects with past co workers via email. He has good relationship with his 38 yo son and his family and grandson they live in [place]. He has good relationship with his daughter and her  and grandchildren and that is why they moved here to [place] a few years ago. He also has added responsibility for his mother who was having progressive memory loss and lived with them for a year and then now is at [facility]. He goes and sees her several times a week and talks with her on the phone daily.     The following portions of the patient's history were reviewed and updated as appropriate: allergies, current medications, past family history, past medical history, past social history, past surgical history, and problem list.    Evaluate the six pillars of health: Nutrition, Activity, Sleep, Social Engagement, Stress Management, decreasing toxins ie nicotine, alcohol, illicit drugs     Past Psych History: outpatient therapy when he lived in [place] for loneliness    Substance Abuse: denies all      ROMAIN REVIEWED: no red flags      Family Psychiatric History:  family history  includes Hearing loss in his father and paternal grandmother; Hyperlipidemia in his father; Vision loss in his maternal grandfather.      Social History: patient is a  and has his PhD. He worked for several companies including [employer] He  and they have two adult children 36yo son and 35 yo daughter. Wife is a bedridden after BKA and fell and had an L1 vertebra fracture several years ago. She has no real interest he reports other than eating what she wants even though she is diabetic and purchasing things online they don't need he states. He reports good relationship with his children and grandchildren. Daughter, son in law and almost three grandchildren live in [place], [place]. So lives in [place] about 6 hours away who is  and has grandson. He likes to exercise, walking long distances and riding stationary bike in their house.       Medical/Surgical History:  Past Medical History:   Diagnosis Date    Adenocarcinoma of prostate     Dec 2020 PSA 5.6. Robot assisted lap prostatectomy 4/6/2021. Daphne 4+3=7    Cataract 2021    Very mild case so far, still see well at night    Elevated PSA     Dec 2020 PSA 5.6. Urology follow up    H/O abdominal prostatectomy [facility]/06/2021    [facility]    History of atrial fibrillation     S/p ablation    History of colon polyps 01/11/2021    Per patient ~2012. Normal colonoscopy 2020 per patient. Records requested    History of thyroid cancer 2010    R hemithyreoidectomy. No LN or metastasis per patient    Infectious viral hepatitis 1985    Type A    Osteoarthritis of right knee 08/27/2021    Xray 8/2021    Postprocedural male urethral stricture 08/27/2021    After prostate surgery    Postsurgical hypothyroidism     R hemithyreoidectomy 2010 - papillary thyroid cancer    Stress incontinence of urine 08/27/2021    After prostate surgery     Past Surgical History:   Procedure Laterality Date    ADENOIDECTOMY  March 1966    6 YO at the time    CARDIAC ABLATION   12/2018    a-fib    in [facility]    HERNIA REPAIR  03/1966    PROSTATECTOMY  04/06/2021    Laparoscopic    THYROIDECTOMY, PARTIAL Right 10/2010    Pappilary thyroid cancer    TONSILLECTOMY  March 1966       No Known Allergies    Current Medications:   Current Outpatient Medications   Medication Sig Dispense Refill    cholecalciferol (VITAMIN D3) 10 MCG (400 UNIT) tablet Take 1 tablet by mouth Daily. Seasonally in Winter month's      levothyroxine (SYNTHROID, LEVOTHROID) 88 MCG tablet TAKE 1 TABLET DAILY 90 tablet 3    mupirocin (BACTROBAN) 2 % ointment Apply 1 application  topically to the appropriate area as directed 3 (Three) Times a Day. Apply to affected area 30 g 1    triamcinolone (KENALOG) 0.025 % ointment Apply 1 application  topically to the appropriate area as directed 2 (Two) Times a Day. Apply to itchy skin BID for 2-3 weeks. 68 g 0    vitamin C (ASCORBIC ACID) 250 MG tablet Take 1 tablet by mouth Daily.      vitamin E 100 UNIT capsule Take 1 capsule by mouth Daily.       No current facility-administered medications for this visit.       Lab Results:  Lab on 05/02/2024   Component Date Value Ref Range Status    Total Cholesterol 05/02/2024 209 (H)  0 - 200 mg/dL Final    Triglycerides 05/02/2024 57  0 - 150 mg/dL Final    HDL Cholesterol 05/02/2024 68 (H)  40 - 60 mg/dL Final    LDL Cholesterol  05/02/2024 131 (H)  0 - 100 mg/dL Final    VLDL Cholesterol 05/02/2024 10  5 - 40 mg/dL Final    LDL/HDL Ratio 05/02/2024 1.91   Final    WBC 05/02/2024 8.52  3.40 - 10.80 10*3/mm3 Final    RBC 05/02/2024 5.00  4.14 - 5.80 10*6/mm3 Final    Hemoglobin 05/02/2024 14.8  13.0 - 17.7 g/dL Final    Hematocrit 05/02/2024 44.1  37.5 - 51.0 % Final    MCV 05/02/2024 88.2  79.0 - 97.0 fL Final    MCH 05/02/2024 29.6  26.6 - 33.0 pg Final    MCHC 05/02/2024 33.6  31.5 - 35.7 g/dL Final    RDW 05/02/2024 12.3  12.3 - 15.4 % Final    RDW-SD 05/02/2024 38.9  37.0 - 54.0 fl Final    MPV 05/02/2024 10.4  6.0 - 12.0 fL  Final    Platelets 05/02/2024 287  140 - 450 10*3/mm3 Final    Glucose 05/02/2024 86  65 - 99 mg/dL Final    BUN 05/02/2024 15  8 - 23 mg/dL Final    Creatinine 05/02/2024 0.89  0.76 - 1.27 mg/dL Final    Sodium 05/02/2024 142  136 - 145 mmol/L Final    Potassium 05/02/2024 4.4  3.5 - 5.2 mmol/L Final    Chloride 05/02/2024 105  98 - 107 mmol/L Final    CO2 05/02/2024 25.8  22.0 - 29.0 mmol/L Final    Calcium 05/02/2024 9.7  8.6 - 10.5 mg/dL Final    Total Protein 05/02/2024 7.2  6.0 - 8.5 g/dL Final    Albumin 05/02/2024 4.4  3.5 - 5.2 g/dL Final    ALT (SGPT) 05/02/2024 21  1 - 41 U/L Final    AST (SGOT) 05/02/2024 27  1 - 40 U/L Final    Alkaline Phosphatase 05/02/2024 70  39 - 117 U/L Final    Total Bilirubin 05/02/2024 0.7  0.0 - 1.2 mg/dL Final    Globulin 05/02/2024 2.8  gm/dL Final    A/G Ratio 05/02/2024 1.6  g/dL Final    BUN/Creatinine Ratio 05/02/2024 16.9  7.0 - 25.0 Final    Anion Gap 05/02/2024 11.2  5.0 - 15.0 mmol/L Final    eGFR 05/02/2024 96.9  >60.0 mL/min/1.73 Final    TSH 05/02/2024 3.390  0.270 - 4.200 uIU/mL Final    Free T4 05/02/2024 1.45  0.93 - 1.70 ng/dL Final         Review of Systems Constitutional: Negative for appetite change, chills, diaphoresis, fatigue, fever and unexpected weight change.   HENT: Negative for hearing loss, sore throat, trouble swallowing and voice change.    Eyes: Negative for photophobia and visual disturbance.   Respiratory: Negative for cough, chest tightness and shortness of breath.    Cardiovascular: Negative for chest pain and palpitations.   Gastrointestinal: Negative for abdominal pain, constipation, nausea and vomiting.   Endocrine: Negative for cold intolerance and heat intolerance.   Genitourinary: Negative for dysuria and frequency.   Musculoskeletal: Negative for arthralgia, back pain, joint swelling and neck stiffness.   Skin: Negative for color change and wound.   Allergic/Immunologic: Negative for environmental allergies and immunocompromised  state.   Neurological: Negative for dizziness, tremors, seizures, syncope, weakness, light-headedness and headaches.   Hematological: Negative for adenopathy. Does not bruise/bleed easily.    Objective   Physical Exam  There were no vitals taken for this visit.    SARAH-7:    Over the last two weeks, how often have you been bothered by the following problems?  Feeling nervous, anxious or on edge: Nearly every day (ON EDGE)  Not being able to stop or control worrying: Several days  Worrying too much about different things: Several days  Trouble Relaxing: Nearly every day  Being so restless that it is hard to sit still: Nearly every day  Becoming easily annoyed or irritable: Nearly every day  Feeling afraid as if something awful might happen: Not at all  SARAH 7 Total Score: 14  If you checked any problems, how difficult have these problems made it for you to do your work, take care of things at home, or get along with other people: Very difficult  0-4: Minimal anxiety  5-9: Mild anxiety  10-14: Moderate anxiety  15-21: Severe anxiety    PHQ-9:      10/2/2024     3:00 PM   PHQ-2/PHQ-9 Depression Screening   Little Interest or Pleasure in Doing Things 1-->several days   Feeling Down, Depressed or Hopeless 3-->nearly every day   Trouble Falling or Staying Asleep, or Sleeping Too Much 1-->several days   Feeling Tired or Having Little Energy 1-->several days   Poor Appetite or Overeating 1-->several days   Feeling Bad about Yourself - or that You are a Failure or Have Let Yourself or Your Family Down 1-->several days   Trouble Concentrating on Things, Such as Reading the Newspaper or Watching Television 1-->several days   Moving or Speaking So Slowly that Other People Could Have Noticed? Or the Opposite - Being So Fidgety 0-->not at all   Thoughts that You Would be Better Off Dead or of Hurting Yourself in Some Way 0-->not at all   PHQ-9: Brief Depression Severity Measure Score 9   If You Checked Off Any Problems, How  Difficult Have These Problems Made It For You to Do Your Work, Take Care of Things at Home, or Get Along with Other People? very difficult      5-9: Minimal symptoms  10-14: Major depression mild  15-19: Major depression moderate  Greater then 20: Major depression severe      Mental Status Exam:   Appearance:  dressed appropriately, very thin BMI 20.1  Hygiene:   good  Cooperation:  Cooperative  Eye Contact:  Good  Psychomotor Behavior:  Appropriate  Mood:  frustrated and decreased range  Affect:  Appropriate  Hopelessness: 5  Speech:  Normal  Thought Process:  Linear  Thought Content:  Normal  Suicidal:  None  Homicidal:  None  Hallucinations:  None  Delusion:  None  Memory:  Intact  Orientation:  Person, Place, Time, and Situation  Reliability:  good  Insight:  Good  Judgement:  Good  Impulse Control:  Good  Physical/Medical Issues:  No       Short-term goals: Patient will be compliant with clinic appointments.  Patient will be engaged in therapy, medication compliant with minimal side effects. Patient  will report decrease of symptoms and frequency.    Long-term goals: Patient will have minimal symptoms of mental health disorder with continued treatment. Patient will be compliant with treatment and appointments.       Problem list: stress reaction, caregiver fatigue   Strengths: patient appears motivated for treatment          Assessment & Plan   Diagnoses and all orders for this visit:    1. Stress reaction (Primary)    2. Caregiver with fatigue        A psychological evaluation was conducted in order to assess past and current level of functioning. Areas assessed included, but were not limited to: perception of social support, perception of ability to face and deal with challenges in life (positive functioning), anxiety symptoms, depressive symptoms, perspective on beliefs/belief system, coping skills for stress, intelligence level,  Therapeutic rapport was established.     Assisted patient in processing above  session content; acknowledged and normalized patient’s thoughts, feelings, and concerns.  Applied  positive coping skills and behavior management in session. Allowed patient to freely discuss issues without interruption or judgment. Provided safe, confidential environment to facilitate the development of positive therapeutic relationship and encourage open, honest communication.     Encouraged pt to ask wife if she would be open to therapy at this time with this provider and do this by telehealth     Assisted patient in identifying risk factors which would indicate the need for higher level of care including thoughts to harm self or others and/or self-harming behavior and encouraged patient to contact this office, call 911, or present to the nearest emergency room should any of these events occur. Discussed crisis intervention services and means to access.  Patient adamantly and convincingly denies current suicidal or homicidal ideation or perceptual disturbance.    Discussed diagnosis and recommendations for treatment:    PROVIDE: Cognitive Behavioral Therapy and Solution Focused Therapy to improve functioning, maintain stability, and avoid decompensation and the need for higher level of care.    MEDICATION MANAGEMENT RECOMMENDATIONS:none at this time      We discussed risks, benefits,goals and side effects of the above medication and the patient was agreeable with the plan.Patient was educated on the importance of compliance with treatment and follow-up appointments.To call for questions or concerns and return early if necessary. Crisis plan reviewed including going to the Emergency department.       Treatment Plan: stabilize mood,  patient will stay out of the hospital and be at optimal level of functioning, take all medication as prescribed. Patient verbalized  understanding and agreement to plan.      Return in about 3 weeks (around 10/23/2024). In person

## 2024-10-23 ENCOUNTER — OFFICE VISIT (OUTPATIENT)
Dept: PSYCHIATRY | Facility: CLINIC | Age: 64
End: 2024-10-23
Payer: COMMERCIAL

## 2024-10-23 DIAGNOSIS — F43.0 STRESS REACTION: Primary | ICD-10-CM

## 2024-10-23 DIAGNOSIS — R53.83 CAREGIVER WITH FATIGUE: ICD-10-CM

## 2024-10-23 NOTE — PROGRESS NOTES
THERAPY PROGRESS NOTE  10/23/24    Subjective   Salazar Alex is a 64 y.o. male who met with the undersigned for a scheduled individual outpatient therapy session in person face to face. Salazar's wife Erica has breast cancer s/p surgery and was intolerant of chemotherapy, not awaiting irradiation treatments to begin.     Chief Complaint: stress reaction, caregiver fatigue    Therapy:  Start Time:0857   Stop Time: 0958     (60 ) minutes was spent for psychotherapy. Assisted patient in processing patient's stress and caregiver fatigue. Acknowledged and normalized patient's thoughts, feelings, and concerns. Utilized cognitive behavioral therapy to assist the patient in recognizing more appropriate coping mechanisms when they become agitated/anxious which are proven effective in reducing the severity of frequency of symptoms.     CLINICAL MANEUVERING/INTERVENTION:   Patient talked about current stressors of caring for someone who doesn't try to care for themselves . Venting of frustrations was conducted. Venting of concerns continued regarding having a life outside of caring for someone. Feelings were processed and validated, both negative and positive. Flushing out worries and concerns was conducted in order to diminish emotional tension. Processing wife's current treatment and logistics was conducted. Ways in which patient may take stress off  himself in a purposeful manner was discussed. Discussed respite care, eldercare law advise, self care with cont walking and other exercise. Patient cont to work part time. Patient was assisted in 'talking out' what he may do if his health becomes a challenge, keeping in mind the notion that there is typically a solution to any given problem. Patient's wife is an amputee and has chronic back pain, diabetic but eats what she wants. Utilized motivational interviewing techniques including complex reflections to attempt to assist the patient and focusing on the positive and to  maintain and encourage calming outlook. The patient expressed gratitude for today's session and said that counseling helps them feel better.        Appearance: appropriate  Hygiene:   good  Cooperation:  Cooperative  Eye Contact:  Good  Psychomotor Behavior:  No psychomotor agitation/retardation, No EPS, No motor tics  Mood: stressed  Affect:  Appropriate  Hopelessness: Denies  Speech:  Normal  Thought Process:  Linear  Thought Content:  Normal  Concentration: Normal   Suicidal:  None  Homicidal:  None  Hallucinations:  None  Delusion:  None  Memory:  Intact  Orientation:  Person, Place, Time and Situation  Reliability:  good  Insight:  Fair  Judgement: good  Impulse Control: good  Estimated Intelligence: average range      ASSESSMENT: stress reaction, caregiver fatigue     PATIENTS SUPPORT NETWORK INCLUDES:  FUNCTIONAL STATUS: NO IMPAIRMENT  PROGNOSIS: GOOD WITH ONGOING TREATMENT    PLAN: encouraged to call for respite care for wife, Visiting [place] or some health care involvement for the home, assistant especially while he goes out of town for 5 days.     Patient will continue therapy and  adhere to medication regimen as prescribed. Provide Cognitive Behavioral Therapy and Solution Focused Therapy to improve functioning, maintain stability, and avoid decompensation and the need for higher level of care. Instructed to call for questions or concerns and return early if necessary.      Return in about 6 weeks (around 12/4/2024).

## 2025-03-05 DIAGNOSIS — E89.0 POSTSURGICAL HYPOTHYROIDISM: ICD-10-CM

## 2025-03-05 RX ORDER — LEVOTHYROXINE SODIUM 88 UG/1
TABLET ORAL
Qty: 90 TABLET | Refills: 3 | Status: SHIPPED | OUTPATIENT
Start: 2025-03-05

## 2025-04-03 ENCOUNTER — OFFICE VISIT (OUTPATIENT)
Dept: INTERNAL MEDICINE | Facility: CLINIC | Age: 65
End: 2025-04-03
Payer: COMMERCIAL

## 2025-04-03 VITALS
HEART RATE: 80 BPM | WEIGHT: 149.6 LBS | HEIGHT: 71 IN | OXYGEN SATURATION: 99 % | TEMPERATURE: 98.6 F | SYSTOLIC BLOOD PRESSURE: 120 MMHG | DIASTOLIC BLOOD PRESSURE: 82 MMHG | BODY MASS INDEX: 20.94 KG/M2

## 2025-04-03 DIAGNOSIS — M77.01 MEDIAL EPICONDYLITIS OF RIGHT ELBOW: ICD-10-CM

## 2025-04-03 DIAGNOSIS — J32.0 RIGHT MAXILLARY SINUSITIS: Primary | ICD-10-CM

## 2025-04-03 DIAGNOSIS — E78.2 MIXED HYPERLIPIDEMIA: Chronic | ICD-10-CM

## 2025-04-03 DIAGNOSIS — C61 ADENOCARCINOMA OF PROSTATE: Chronic | ICD-10-CM

## 2025-04-03 DIAGNOSIS — E89.0 POSTSURGICAL HYPOTHYROIDISM: Chronic | ICD-10-CM

## 2025-04-03 DIAGNOSIS — E55.9 VITAMIN D DEFICIENCY: ICD-10-CM

## 2025-04-03 PROBLEM — H93.19 TINNITUS: Status: ACTIVE | Noted: 2023-05-03

## 2025-04-03 PROCEDURE — 99214 OFFICE O/P EST MOD 30 MIN: CPT | Performed by: STUDENT IN AN ORGANIZED HEALTH CARE EDUCATION/TRAINING PROGRAM

## 2025-04-03 RX ORDER — AMOXICILLIN 500 MG/1
500 CAPSULE ORAL 3 TIMES DAILY
Qty: 21 CAPSULE | Refills: 0 | Status: SHIPPED | OUTPATIENT
Start: 2025-04-03 | End: 2025-04-10

## 2025-04-03 NOTE — PROGRESS NOTES
"Chief Complaint  Salazar Alex is a 64 y.o. male presenting for Cough, sinus pressure, and Hypothyroidism.     Patient has a past medical history of thyroid cancer (2010) status post thyroidectomy, mild hyperlipidemia, IBS, colon polyp, vitamin D deficiency, atrial fibrillation status post ablation, BPH and prostate cancer s/p laparoscopic prostatectomy 4/2021 c/b urethral stricture and mild incontinence.      History of Present Illness  Patient is here for acute visit.    Patient got sick last Thursday, 3/27/2025 with nasal congestion, runny nose, no sore throat or cough.  Some body aches, also some night sweats, but no chills.  Temperature max 99.8 F.  Yesterday he noticed pressure and some pain over the right maxilla, also having green mucus.  Worse when bending forward.  Today symptoms seem improved.    He also has noticed pain and tenderness of his medial right elbow for the last 2 years.  No injury or activity he can remember.  It does not bother him too much.  He was wondering if there was something he could do to alleviate.    The following portions of the patient's history were reviewed and updated as appropriate: allergies, current medications, past family history, past medical history, past social history, past surgical history, and problem list.    Objective  /82 (BP Location: Left arm, Patient Position: Sitting, Cuff Size: Adult)   Pulse 80   Temp 98.6 °F (37 °C) (Infrared)   Ht 181 cm (71.26\")   Wt 67.9 kg (149 lb 9.6 oz)   SpO2 99%   BMI 20.71 kg/m²     Physical Exam  Vitals reviewed.   Constitutional:       Appearance: Normal appearance.   HENT:      Head: Normocephalic and atraumatic.      Nose: Nose normal. No congestion.      Mouth/Throat:      Mouth: Mucous membranes are moist.   Eyes:      Extraocular Movements: Extraocular movements intact.      Conjunctiva/sclera: Conjunctivae normal.   Cardiovascular:      Rate and Rhythm: Normal rate and regular rhythm.      Heart sounds: " Normal heart sounds. No murmur heard.  Pulmonary:      Effort: Pulmonary effort is normal.      Breath sounds: Normal breath sounds.   Abdominal:      Palpations: Abdomen is soft.   Musculoskeletal:         General: Tenderness present.        Arms:       Cervical back: Neck supple.   Skin:     General: Skin is warm and dry.   Neurological:      Mental Status: He is alert and oriented to person, place, and time. Mental status is at baseline.   Psychiatric:         Behavior: Behavior normal.         Thought Content: Thought content normal.         Assessment/Plan   1. Right maxillary sinusitis  Symptoms appear to be improving since yesterday, so for now I recommend holding off on antibiotics.  Suggest using Flonase for few weeks.  He can also use Afrin nasal spray for 3-5 days, but not longer due to risk of rebound congestion.  If symptoms get worse over the next few days with fever, worsening pain, I would go ahead and start with the antibiotic.  - amoxicillin (AMOXIL) 500 MG capsule; Take 1 capsule by mouth 3 (Three) Times a Day for 7 days. Patient will call pharmacy within 1-2 w if AB needed.  Dispense: 21 capsule; Refill: 0    2. Medial epicondylitis of right elbow  No obvious trigger.  Discussed exercises, stretching, lifting mild weights gently with slow motion.  Suggest 10 repetitions 3-4 times daily.  Added patient reference to after visit summary.  If no improvement consider physical therapy versus referral to sports medicine for  steroid injection.    3. Mixed hyperlipidemia  The 10-year ASCVD risk score (Rusty LAND, et al., 2019) is: 8.4%    Values used to calculate the score:      Age: 64 years      Sex: Male      Is Non- : No      Diabetic: No      Tobacco smoker: No      Systolic Blood Pressure: 120 mmHg      Is BP treated: No      HDL Cholesterol: 68 mg/dL      Total Cholesterol: 209 mg/dL  Stable.  Recheck lipids before annual physical.  Consider medication based on levels and  cardiovascular risk.  - Lipid Panel; Future      4. Postsurgical hypothyroidism  Stable.  Continue on Synthroid 88 mcg.  Recheck blood work before next visit  - TSH; Future  - T4, Free; Future    5. Adenocarcinoma of prostate  Stable, no recurrence.  Continue follow-up with urology.  Will do blood work before next visit.  - CBC (No Diff); Future  - Comprehensive Metabolic Panel; Future    6. Vitamin D deficiency  Continue on vitamin D supplement.  Will recheck before next visit  - Vitamin D,25-Hydroxy; Future      Return if symptoms worsen or fail to improve, for Next scheduled follow up.    Future Appointments         Provider Department Center    5/6/2025 8:30 AM Olegario Lam MD [facility]IC[facility] [place]              Olegario Lam MD  Family Medicine  04/03/2025

## 2025-04-15 ENCOUNTER — OFFICE VISIT (OUTPATIENT)
Dept: INTERNAL MEDICINE | Facility: CLINIC | Age: 65
End: 2025-04-15
Payer: COMMERCIAL

## 2025-04-15 VITALS
WEIGHT: 148 LBS | TEMPERATURE: 98 F | HEART RATE: 80 BPM | SYSTOLIC BLOOD PRESSURE: 110 MMHG | DIASTOLIC BLOOD PRESSURE: 68 MMHG | HEIGHT: 71 IN | BODY MASS INDEX: 20.72 KG/M2 | OXYGEN SATURATION: 97 %

## 2025-04-15 DIAGNOSIS — R05.1 ACUTE COUGH: ICD-10-CM

## 2025-04-15 DIAGNOSIS — J10.1 INFLUENZA A: Primary | ICD-10-CM

## 2025-04-15 LAB
EXPIRATION DATE: ABNORMAL
FLUAV AG UPPER RESP QL IA.RAPID: DETECTED
FLUBV AG UPPER RESP QL IA.RAPID: NOT DETECTED
INTERNAL CONTROL: ABNORMAL
Lab: ABNORMAL
SARS-COV-2 AG UPPER RESP QL IA.RAPID: NOT DETECTED

## 2025-04-15 PROCEDURE — 87428 SARSCOV & INF VIR A&B AG IA: CPT | Performed by: STUDENT IN AN ORGANIZED HEALTH CARE EDUCATION/TRAINING PROGRAM

## 2025-04-15 PROCEDURE — 99213 OFFICE O/P EST LOW 20 MIN: CPT | Performed by: STUDENT IN AN ORGANIZED HEALTH CARE EDUCATION/TRAINING PROGRAM

## 2025-04-15 RX ORDER — DEXTROMETHORPHAN HYDROBROMIDE AND PROMETHAZINE HYDROCHLORIDE 15; 6.25 MG/5ML; MG/5ML
5 SYRUP ORAL 4 TIMES DAILY PRN
Qty: 240 ML | Refills: 0 | Status: SHIPPED | OUTPATIENT
Start: 2025-04-15

## 2025-04-15 RX ORDER — OSELTAMIVIR PHOSPHATE 75 MG/1
75 CAPSULE ORAL 2 TIMES DAILY
Qty: 10 CAPSULE | Refills: 0 | Status: SHIPPED | OUTPATIENT
Start: 2025-04-15 | End: 2025-04-20

## 2025-04-15 NOTE — PROGRESS NOTES
"Chief Complaint  Salazar Alex is a 64 y.o. male presenting for Cough, Generalized Body Aches, and Fatigue.     Patient has a past medical history of thyroid cancer (2010) status post thyroidectomy, mild hyperlipidemia, IBS, colon polyp, vitamin D deficiency, atrial fibrillation status post ablation, BPH and prostate cancer s/p laparoscopic prostatectomy 4/2021 c/b urethral stricture and mild incontinence.      History of Present Illness  Patient is here for acute visit.  His wife was admitted to the hospital on Saturday, 4/12/2025 with influenza A.    Yesterday morning patient developed muscle aches, significant fatigue, temperature 99.8 F, also runny nose, nasal congestion, green nasal mucus especially in the morning, otherwise clear mucus.  Also cough with clear phlegm.  No shortness of breath or wheezing.  Of note he did have pneumonia 7 years ago.  He did not have the flu vaccine this season.    The following portions of the patient's history were reviewed and updated as appropriate: allergies, current medications, past family history, past medical history, past social history, past surgical history, and problem list.    Objective  /68 (BP Location: Left arm, Patient Position: Sitting, Cuff Size: Adult)   Pulse 80   Temp 98 °F (36.7 °C) (Infrared)   Ht 181 cm (71.26\")   Wt 67.1 kg (148 lb)   SpO2 97%   BMI 20.49 kg/m²     Physical Exam  Vitals reviewed.   Constitutional:       General: He is not in acute distress.     Appearance: Normal appearance. He is ill-appearing. He is not toxic-appearing or diaphoretic.   HENT:      Head: Normocephalic and atraumatic.      Right Ear: Tympanic membrane, ear canal and external ear normal. There is no impacted cerumen.      Left Ear: Tympanic membrane, ear canal and external ear normal. There is no impacted cerumen.      Nose: Nose normal. Congestion present.      Mouth/Throat:      Mouth: Mucous membranes are moist.      Pharynx: Oropharynx is clear. No " posterior oropharyngeal erythema.   Eyes:      Extraocular Movements: Extraocular movements intact.      Conjunctiva/sclera: Conjunctivae normal.   Cardiovascular:      Rate and Rhythm: Normal rate and regular rhythm.      Heart sounds: Normal heart sounds. No murmur heard.  Pulmonary:      Effort: Pulmonary effort is normal.      Breath sounds: Normal breath sounds. No wheezing.   Abdominal:      Palpations: Abdomen is soft.   Musculoskeletal:      Cervical back: Neck supple. No tenderness.   Lymphadenopathy:      Cervical: No cervical adenopathy.   Skin:     General: Skin is warm and dry.   Neurological:      Mental Status: He is alert and oriented to person, place, and time. Mental status is at baseline.   Psychiatric:         Behavior: Behavior normal.         Thought Content: Thought content normal.         Assessment/Plan   1. Influenza A  Reassuring vitals, fairly well-appearing.  He is within 48-hour window for Tamiflu and is interested in treatment.  This can help reduce severity of symptoms and reduce the possibility of ending up in the hospital.  There is no concern for pneumonia at this time, but if he develops any worsening breathing issues, worsening cough, weakness, confusion or any more severe symptoms he should consider going to the emergency room, or if not at least get back in touch.  WBC bacterial infections develop 1-2 weeks into the course, so for now I recommend monitoring symptoms, starting Tamiflu, and use cough syrup as needed.  - POCT SARS-CoV-2 + Flu Antigen JOLEEN  - oseltamivir (Tamiflu) 75 MG capsule; Take 1 capsule by mouth 2 (Two) Times a Day for 5 days.  Dispense: 10 capsule; Refill: 0    2. Acute cough  - promethazine-dextromethorphan (PROMETHAZINE-DM) 6.25-15 MG/5ML syrup; Take 5 mL by mouth 4 (Four) Times a Day As Needed for Cough.  Dispense: 240 mL; Refill: 0      Return if symptoms worsen or fail to improve, for Next scheduled follow up.    Future Appointments         Provider  Department Center    5/6/2025 8:30 AM Olegario Lam MD [facility] MEDIC[facility] [place]              Olegario Lam MD  Family Medicine  04/15/2025

## 2025-04-19 ENCOUNTER — APPOINTMENT (OUTPATIENT)
Dept: GENERAL RADIOLOGY | Facility: HOSPITAL | Age: 65
End: 2025-04-19
Payer: COMMERCIAL

## 2025-04-19 ENCOUNTER — HOSPITAL ENCOUNTER (EMERGENCY)
Facility: HOSPITAL | Age: 65
Discharge: HOME | End: 2025-04-19
Attending: EMERGENCY MEDICINE
Payer: COMMERCIAL

## 2025-04-19 VITALS
HEIGHT: 71 IN | RESPIRATION RATE: 26 BRPM | SYSTOLIC BLOOD PRESSURE: 115 MMHG | HEART RATE: 66 BPM | DIASTOLIC BLOOD PRESSURE: 77 MMHG | BODY MASS INDEX: 21 KG/M2 | TEMPERATURE: 97.5 F | WEIGHT: 150 LBS | OXYGEN SATURATION: 96 %

## 2025-04-19 DIAGNOSIS — N39.0 URINARY TRACT INFECTION WITHOUT HEMATURIA, SITE UNSPECIFIED: Primary | ICD-10-CM

## 2025-04-19 LAB
ALBUMIN SERPL-MCNC: 3.4 G/DL (ref 3.5–5.2)
ALBUMIN/GLOB SERPL: 1 G/DL
ALP SERPL-CCNC: 90 U/L (ref 39–117)
ALT SERPL W P-5'-P-CCNC: 10 U/L (ref 1–41)
ANION GAP SERPL CALCULATED.3IONS-SCNC: 12 MMOL/L (ref 5–15)
AST SERPL-CCNC: 21 U/L (ref 1–40)
BACTERIA UR QL AUTO: ABNORMAL /HPF
BASOPHILS # BLD AUTO: 0.02 10*3/MM3 (ref 0–0.2)
BASOPHILS NFR BLD AUTO: 0.1 % (ref 0–1.5)
BILIRUB SERPL-MCNC: 0.8 MG/DL (ref 0–1.2)
BILIRUB UR QL STRIP: ABNORMAL
BUN SERPL-MCNC: 18 MG/DL (ref 8–23)
BUN/CREAT SERPL: 20 (ref 7–25)
CALCIUM SPEC-SCNC: 8.8 MG/DL (ref 8.6–10.5)
CHLORIDE SERPL-SCNC: 100 MMOL/L (ref 98–107)
CLARITY UR: ABNORMAL
CO2 SERPL-SCNC: 24 MMOL/L (ref 22–29)
COARSE GRAN CASTS URNS QL MICRO: ABNORMAL /LPF
COLOR UR: ABNORMAL
CREAT SERPL-MCNC: 0.9 MG/DL (ref 0.76–1.27)
DEPRECATED RDW RBC AUTO: 35.8 FL (ref 37–54)
EGFRCR SERPLBLD CKD-EPI 2021: 96 ML/MIN/1.73
EOSINOPHIL # BLD AUTO: 0.02 10*3/MM3 (ref 0–0.4)
EOSINOPHIL NFR BLD AUTO: 0.1 % (ref 0.3–6.2)
ERYTHROCYTE [DISTWIDTH] IN BLOOD BY AUTOMATED COUNT: 11.6 % (ref 12.3–15.4)
FINE GRAN CASTS URNS QL MICRO: ABNORMAL /LPF
GLOBULIN UR ELPH-MCNC: 3.3 GM/DL
GLUCOSE SERPL-MCNC: 117 MG/DL (ref 65–99)
GLUCOSE UR STRIP-MCNC: NEGATIVE MG/DL
HCT VFR BLD AUTO: 40.2 % (ref 37.5–51)
HGB BLD-MCNC: 13.4 G/DL (ref 13–17.7)
HGB UR QL STRIP.AUTO: ABNORMAL
HYALINE CASTS UR QL AUTO: ABNORMAL /LPF
IMM GRANULOCYTES # BLD AUTO: 0.05 10*3/MM3 (ref 0–0.05)
IMM GRANULOCYTES NFR BLD AUTO: 0.4 % (ref 0–0.5)
KETONES UR QL STRIP: ABNORMAL
LEUKOCYTE ESTERASE UR QL STRIP.AUTO: NEGATIVE
LYMPHOCYTES # BLD AUTO: 1.17 10*3/MM3 (ref 0.7–3.1)
LYMPHOCYTES NFR BLD AUTO: 8.6 % (ref 19.6–45.3)
MAGNESIUM SERPL-MCNC: 2.1 MG/DL (ref 1.6–2.4)
MCH RBC QN AUTO: 28.3 PG (ref 26.6–33)
MCHC RBC AUTO-ENTMCNC: 33.3 G/DL (ref 31.5–35.7)
MCV RBC AUTO: 85 FL (ref 79–97)
MONOCYTES # BLD AUTO: 0.95 10*3/MM3 (ref 0.1–0.9)
MONOCYTES NFR BLD AUTO: 7 % (ref 5–12)
MUCOUS THREADS URNS QL MICRO: ABNORMAL /HPF
NEUTROPHILS NFR BLD AUTO: 11.34 10*3/MM3 (ref 1.7–7)
NEUTROPHILS NFR BLD AUTO: 83.8 % (ref 42.7–76)
NITRITE UR QL STRIP: NEGATIVE
NRBC BLD AUTO-RTO: 0 /100 WBC (ref 0–0.2)
PH UR STRIP.AUTO: 5.5 [PH] (ref 5–8)
PLATELET # BLD AUTO: 276 10*3/MM3 (ref 140–450)
PMV BLD AUTO: 10.1 FL (ref 6–12)
POTASSIUM SERPL-SCNC: 3.7 MMOL/L (ref 3.5–5.2)
PROT SERPL-MCNC: 6.7 G/DL (ref 6–8.5)
PROT UR QL STRIP: ABNORMAL
RBC # BLD AUTO: 4.73 10*6/MM3 (ref 4.14–5.8)
RBC # UR STRIP: ABNORMAL /HPF
REF LAB TEST METHOD: ABNORMAL
SODIUM SERPL-SCNC: 136 MMOL/L (ref 136–145)
SP GR UR STRIP: 1.03 (ref 1–1.03)
SQUAMOUS #/AREA URNS HPF: ABNORMAL /HPF
TRANS CELLS #/AREA URNS HPF: ABNORMAL /HPF
UROBILINOGEN UR QL STRIP: ABNORMAL
WBC # UR STRIP: ABNORMAL /HPF
WBC NRBC COR # BLD AUTO: 13.55 10*3/MM3 (ref 3.4–10.8)

## 2025-04-19 PROCEDURE — 83735 ASSAY OF MAGNESIUM: CPT | Performed by: PHYSICIAN ASSISTANT

## 2025-04-19 PROCEDURE — 96360 HYDRATION IV INFUSION INIT: CPT

## 2025-04-19 PROCEDURE — 80053 COMPREHEN METABOLIC PANEL: CPT | Performed by: PHYSICIAN ASSISTANT

## 2025-04-19 PROCEDURE — 25810000003 SODIUM CHLORIDE 0.9 % SOLUTION: Performed by: PHYSICIAN ASSISTANT

## 2025-04-19 PROCEDURE — 85025 COMPLETE CBC W/AUTO DIFF WBC: CPT | Performed by: PHYSICIAN ASSISTANT

## 2025-04-19 PROCEDURE — 87086 URINE CULTURE/COLONY COUNT: CPT | Performed by: PHYSICIAN ASSISTANT

## 2025-04-19 PROCEDURE — 71045 X-RAY EXAM CHEST 1 VIEW: CPT

## 2025-04-19 PROCEDURE — 81001 URINALYSIS AUTO W/SCOPE: CPT | Performed by: PHYSICIAN ASSISTANT

## 2025-04-19 PROCEDURE — 99283 EMERGENCY DEPT VISIT LOW MDM: CPT

## 2025-04-19 RX ORDER — CEFUROXIME AXETIL 500 MG/1
500 TABLET ORAL 2 TIMES DAILY
Qty: 14 TABLET | Refills: 0 | Status: SHIPPED | OUTPATIENT
Start: 2025-04-19 | End: 2025-04-26

## 2025-04-19 RX ADMIN — SODIUM CHLORIDE 1000 ML: 9 INJECTION, SOLUTION INTRAVENOUS at 08:34

## 2025-04-19 NOTE — ED PROVIDER NOTES
Subjective  History of Present Illness:    Chief Complaint: Body aches, chills, loss of appetite, recent flu  History of Present Illness: 64-year-old male presents with bodyaches, chills, decreased appetite, cough and weakness, he was diagnosed with the flu on April 15, 2025, he was prescribed Tamiflu, and is on his last dose.  He states he has not improved, he continues to have the same symptoms that he had when he presented to his primary care physician's office and was diagnosed with the flu.  He states the symptoms wax and wane, they appear to be worse at night and improved during the day.  She states she is drinking lots of water, but is not had an appetite and has not been eating.  He states he has weakness, and a cough.  Onset: Gradual  Duration: 1 week  Exacerbating / Alleviating factors: Recently diagnosed with flu, taking Tamiflu  Associated symptoms: Decreased appetite      Nurses Notes reviewed and agree, including vitals, allergies, social history and prior medical history.     REVIEW OF SYSTEMS: All systems reviewed and not pertinent unless noted.    Review of Systems   Constitutional:  Positive for appetite change, chills, fatigue and fever.   Neurological:  Positive for weakness.   All other systems reviewed and are negative.      Past Medical History:   Diagnosis Date    Adenocarcinoma of prostate     Dec 2020 PSA 5.6. Robot assisted lap prostatectomy 4/6/2021. Ragland 4+3=7    Benign prostatic hyperplasia 2019    Prostate removed in 2021    Cataract 2021    Very mild case so far, still see well at night    Elevated PSA     Dec 2020 PSA 5.6. Urology follow up    H/O abdominal prostatectomy 04/06/2021    [facility]    History of atrial fibrillation     S/p ablation    History of colon polyps 01/11/2021    Per patient ~2012. Normal colonoscopy 2020 per patient. Records requested    History of thyroid cancer 2010    R hemithyreoidectomy. No LN or metastasis per patient    Infectious viral hepatitis  "1985    Type A    Injury of back 2016    Herniated disk    Osteoarthritis of right knee 08/27/2021    Xray 8/2021    Postprocedural male urethral stricture 08/27/2021    After prostate surgery    Postsurgical hypothyroidism     R hemithyreoidectomy 2010 - papillary thyroid cancer    Stress incontinence of urine 08/27/2021    After prostate surgery       Allergies:    Patient has no known allergies.      Past Surgical History:   Procedure Laterality Date    ADENOIDECTOMY  March 1966    6 YO at the time    CARDIAC ABLATION  12/2018    a-fib    in [facility]    CARDIAC CATHETERIZATION  2019    ablation treatment for afib    COLONOSCOPY  Several    HERNIA REPAIR  03/1966    PROSTATECTOMY  04/06/2021    Laparoscopic    SKIN BIOPSY  2018    Abnormal cells on back    THYROIDECTOMY, PARTIAL Right 10/2010    Pappilary thyroid cancer    TONSILLECTOMY  March 1966         Social History     Socioeconomic History    Marital status:    Tobacco Use    Smoking status: Never    Smokeless tobacco: Never   Vaping Use    Vaping status: Never Used   Substance and Sexual Activity    Alcohol use: Yes     Alcohol/week: 7.0 standard drinks of alcohol     Types: 5 Glasses of wine, 2 Drinks containing 0.5 oz of alcohol per week     Comment: weekly    Drug use: Never    Sexual activity: Not Currently     Partners: Female         Family History   Problem Relation Age of Onset    Hearing loss Father         Has tinnitus, which I've apparently inherited    Hyperlipidemia Father     Vision loss Maternal Grandfather         Diagnosed in his 70's    Hearing loss Paternal Grandmother         Had Menir's (sp?) disease    Dementia Mother         Started around age 80       Objective  Physical Exam:  /77   Pulse 66   Temp 97.5 °F (36.4 °C) (Oral)   Resp 26   Ht 180.3 cm (71\")   Wt 68 kg (150 lb)   SpO2 96%   BMI 20.92 kg/m²      Physical Exam  Vitals and nursing note reviewed.   Constitutional:       Appearance: He is " well-developed.   HENT:      Head: Normocephalic and atraumatic.      Mouth/Throat:      Mouth: Mucous membranes are moist.   Eyes:      Extraocular Movements: Extraocular movements intact.   Cardiovascular:      Rate and Rhythm: Normal rate and regular rhythm.   Pulmonary:      Effort: Pulmonary effort is normal.      Breath sounds: Normal breath sounds.   Abdominal:      Palpations: Abdomen is soft.   Musculoskeletal:         General: Normal range of motion.      Cervical back: Normal range of motion.   Skin:     General: Skin is warm and dry.   Neurological:      Mental Status: He is alert and oriented to person, place, and time.   Psychiatric:         Behavior: Behavior normal.         Thought Content: Thought content normal.         Judgment: Judgment normal.           Procedures    ED Course:    Chest x-ray interpretation by me: No acute cardiopulmonary abnormality    ED Course as of 04/19/25 1247   Sat Apr 19, 2025   0833 Review of previous  non ED visits, prior labs, prior imaging, available notes from prior evaluations or visits with specialists, medication list, allergies, past medical history, past surgical history  Review of recent office visit on April 15, 2025 pertaining to evaluation, physical exam, and diagnosis of the flu, as well as review of the medications prescribed [CS]   0921 Patient does report that he has difficulty with urination and previous history of prostate problems, I have ordered a urinalysis to make sure that this is not causing his symptoms [CS]   1003 Bacteria, UA(!): 2+ [CS]   1003 WBC, UA(!): 6-10  2+ bacteria, leukocytosis, urine will be sent for culture, we will treat with an antibiotic based on patient's symptoms [CS]      ED Course User Index  [CS] Todd Otoole Jr., DAVID       Lab Results (last 24 hours)       Procedure Component Value Units Date/Time    CBC Auto Differential [[id number]]  (Abnormal) Collected: 04/19/25 0829    Specimen: Blood Updated: 04/19/25 0850      WBC 13.55 10*3/mm3      RBC 4.73 10*6/mm3      Hemoglobin 13.4 g/dL      Hematocrit 40.2 %      MCV 85.0 fL      MCH 28.3 pg      MCHC 33.3 g/dL      RDW 11.6 %      RDW-SD 35.8 fl      MPV 10.1 fL      Platelets 276 10*3/mm3      Neutrophil % 83.8 %      Lymphocyte % 8.6 %      Monocyte % 7.0 %      Eosinophil % 0.1 %      Basophil % 0.1 %      Immature Grans % 0.4 %      Neutrophils, Absolute 11.34 10*3/mm3      Lymphocytes, Absolute 1.17 10*3/mm3      Monocytes, Absolute 0.95 10*3/mm3      Eosinophils, Absolute 0.02 10*3/mm3      Basophils, Absolute 0.02 10*3/mm3      Immature Grans, Absolute 0.05 10*3/mm3      nRBC 0.0 /100 WBC     Comprehensive Metabolic Panel [[id number]]  (Abnormal) Collected: 04/19/25 0829    Specimen: Blood Updated: 04/19/25 0904     Glucose 117 mg/dL      BUN 18 mg/dL      Creatinine 0.90 mg/dL      Sodium 136 mmol/L      Potassium 3.7 mmol/L      Chloride 100 mmol/L      CO2 24.0 mmol/L      Calcium 8.8 mg/dL      Total Protein 6.7 g/dL      Albumin 3.4 g/dL      ALT (SGPT) 10 U/L      AST (SGOT) 21 U/L      Alkaline Phosphatase 90 U/L      Total Bilirubin 0.8 mg/dL      Globulin 3.3 gm/dL      Comment: Calculated Result        A/G Ratio 1.0 g/dL      BUN/Creatinine Ratio 20.0     Anion Gap 12.0 mmol/L      eGFR 96.0 mL/min/1.73     Narrative:      GFR Categories in Chronic Kidney Disease (CKD)      GFR Category          GFR (mL/min/1.73)    Interpretation  G1                     90 or greater         Normal or high (1)  G2                      60-89                Mild decrease (1)  G3a                   45-59                Mild to moderate decrease  G3b                   30-44                Moderate to severe decrease  G4                    15-29                Severe decrease  G5                    14 or less           Kidney failure          (1)In the absence of evidence of kidney disease, neither GFR category G1 or G2 fulfill the criteria for CKD.    eGFR calculation 2021 CKD-EPI  creatinine equation, which does not include race as a factor    Magnesium [[id number]]  (Normal) Collected: 04/19/25 0829    Specimen: Blood Updated: 04/19/25 0904     Magnesium 2.1 mg/dL     Urinalysis With Culture If Indicated - Urine, Clean Catch [[id number]]  (Abnormal) Collected: 04/19/25 0930    Specimen: Urine, Clean Catch Updated: 04/19/25 0942     Color, UA Dark Yellow     Appearance, UA Cloudy     pH, UA 5.5     Specific Gravity, UA 1.034     Glucose, UA Negative     Ketones, UA 40 mg/dL (2+)     Bilirubin, UA Small (1+)     Blood, UA Trace     Protein,  mg/dL (2+)     Leuk Esterase, UA Negative     Nitrite, UA Negative     Urobilinogen, UA 1.0 E.U./dL    Narrative:      In absence of clinical symptoms, the presence of pyuria, bacteria, and/or nitrites on the urinalysis result does not correlate with infection.    Urinalysis, Microscopic Only - Urine, Clean Catch [[id number]]  (Abnormal) Collected: 04/19/25 0930    Specimen: Urine, Clean Catch Updated: 04/19/25 1002     RBC, UA 0-2 /HPF      WBC, UA 6-10 /HPF      Bacteria, UA 2+ /HPF      Squamous Epithelial Cells, UA 0-2 /HPF      Transitional Epithelial Cells, UA 0-2 /HPF      Hyaline Casts, UA       Unable to determine due to loaded field     /LPF     Coarse Granular Casts, UA 3-6 /LPF      Fine Granular Casts, UA 0-2 /LPF      Mucus, UA Large/3+ /HPF      Methodology Manual Light Microscopy    Urine Culture - Urine, Urine, Clean Catch [[id number]] Collected: 04/19/25 0930    Specimen: Urine, Clean Catch Updated: 04/19/25 1002             XR Chest 1 View  Result Date: 4/19/2025  XR CHEST 1 VW Date of Exam: 4/19/2025 8:50 AM EDT Indication: ccough Comparison: None available. Findings: Normal cardiomediastinal silhouette. The lungs are clear. No pleural effusion or pneumothorax. No acute osseous findings.     Impression: Impression: No acute cardiopulmonary findings. Electronically Signed: Dajuan Dowell MD  4/19/2025 9:03 AM EDT  Workstation ID:  [id number]                                                                    Medical Decision Making  Patient Presentation 64-year-old male recent diagnosis of the flu presented with bodyaches, decreased appetite, chills and weakness    DDX influenza, bronchitis, pneumonia, urinary tract infection, dehydration    Data Review/ Non ED Records /Analysis/Ordering unique tests  Review of previous  non ED visits, prior labs, prior imaging, available notes from prior evaluations or visits with specialists, medication list, allergies, past medical history, past surgical history        Independent Review Studies  I Personally reviewed all laboratory studies performed in the emergency department     Intervention/Re-evaluation IV fluids on reevaluation symptoms did improve    Independent Clinician no consultation    Risk Stratification tools/clinical decision rules 64-year-old male presented with weakness, body aches, chills, recent diagnosis of the flu.  He states that his appetite was decreased due to continued weakness body aches and chills, was concerned about a pneumonia or possibly a urinary tract infection, also consider dehydration.  Labs and urinalysis were performed, he did have ketones, white blood cells and bacteria in his urine concerning for mild dehydration and urinary tract infection considered the patient was symptomatic with urinary hesitancy and frequency.  He was put on oral antibiotics and discharged home    Shared Decision Making discussed this with patient and he was agreeable patient stable for discharge    Disposition discharged home    Problems Addressed:  Urinary tract infection without hematuria, site unspecified: complicated acute illness or injury    Amount and/or Complexity of Data Reviewed  External Data Reviewed: labs, radiology and notes.  Labs: ordered. Decision-making details documented in ED Course.  Radiology: ordered and independent interpretation performed. Decision-making details  documented in ED Course.    Risk  Prescription drug management.          Final diagnoses:   Urinary tract infection without hematuria, site unspecified           Disposition DISCHARGE    Patient discharged in stable condition.    Reviewed implications of results, diagnosis, meds, responsibility to follow up, warning signs and symptoms of possible worsening, potential complications and reasons to return to ER.    Patient/Family voiced understanding of above instructions.    Discussed plan for discharge, as there is no emergent indication for admission.  Pt/family is agreeable and understands need for follow up and possible repeat testing.  Pt/family is aware that discharge does not mean that nothing is wrong but that it indicates no emergency is currently present that requires admission and they must continue care with follow-up as given below or with a physician of their choice.     FOLLOW-UP  [facility] EMERGENCY DEPARTMENT  [address]  [facility] [address] [phone]    If symptoms worsen    Olegario Lam MD  [address]  [facility] [address]  [place] [address]  [phone]    Schedule an appointment as soon as possible for a visit   As needed         Medication List        New Prescriptions      cefuroxime 500 MG tablet  Commonly known as: CEFTIN  Take 1 tablet by mouth 2 (Two) Times a Day for 7 days.               Where to Get Your Medications        These medications were sent to [id number] - [place], [place] - [address] AT [facility] [address] - [phone]  - [phone] FX  [address] [address]      Phone: [phone]   cefuroxime 500 MG tablet             Todd Otoole Jr., PA-C  04/19/25 1603

## 2025-04-20 LAB — BACTERIA SPEC AEROBE CULT: NORMAL

## 2025-05-05 ENCOUNTER — LAB (OUTPATIENT)
Dept: LAB | Facility: HOSPITAL | Age: 65
End: 2025-05-05
Payer: COMMERCIAL

## 2025-05-05 DIAGNOSIS — E78.2 MIXED HYPERLIPIDEMIA: Chronic | ICD-10-CM

## 2025-05-05 DIAGNOSIS — E55.9 VITAMIN D DEFICIENCY: ICD-10-CM

## 2025-05-05 DIAGNOSIS — C61 ADENOCARCINOMA OF PROSTATE: Chronic | ICD-10-CM

## 2025-05-05 DIAGNOSIS — E89.0 POSTSURGICAL HYPOTHYROIDISM: Chronic | ICD-10-CM

## 2025-05-05 LAB
25(OH)D3 SERPL-MCNC: 47.7 NG/ML (ref 30–100)
ALBUMIN SERPL-MCNC: 4.1 G/DL (ref 3.5–5.2)
ALBUMIN/GLOB SERPL: 1.2 G/DL
ALP SERPL-CCNC: 85 U/L (ref 39–117)
ALT SERPL W P-5'-P-CCNC: 29 U/L (ref 1–41)
ANION GAP SERPL CALCULATED.3IONS-SCNC: 12 MMOL/L (ref 5–15)
AST SERPL-CCNC: 33 U/L (ref 1–40)
BILIRUB SERPL-MCNC: 0.5 MG/DL (ref 0–1.2)
BUN SERPL-MCNC: 12 MG/DL (ref 8–23)
BUN/CREAT SERPL: 14.6 (ref 7–25)
CALCIUM SPEC-SCNC: 9.1 MG/DL (ref 8.6–10.5)
CHLORIDE SERPL-SCNC: 104 MMOL/L (ref 98–107)
CHOLEST SERPL-MCNC: 202 MG/DL (ref 0–200)
CO2 SERPL-SCNC: 25 MMOL/L (ref 22–29)
CREAT SERPL-MCNC: 0.82 MG/DL (ref 0.76–1.27)
DEPRECATED RDW RBC AUTO: 38.8 FL (ref 37–54)
EGFRCR SERPLBLD CKD-EPI 2021: 98.7 ML/MIN/1.73
ERYTHROCYTE [DISTWIDTH] IN BLOOD BY AUTOMATED COUNT: 12.3 % (ref 12.3–15.4)
GLOBULIN UR ELPH-MCNC: 3.3 GM/DL
GLUCOSE SERPL-MCNC: 96 MG/DL (ref 65–99)
HCT VFR BLD AUTO: 40.9 % (ref 37.5–51)
HDLC SERPL-MCNC: 53 MG/DL (ref 40–60)
HGB BLD-MCNC: 13.5 G/DL (ref 13–17.7)
LDLC SERPL CALC-MCNC: 132 MG/DL (ref 0–100)
LDLC/HDLC SERPL: 2.45 {RATIO}
MCH RBC QN AUTO: 28.7 PG (ref 26.6–33)
MCHC RBC AUTO-ENTMCNC: 33 G/DL (ref 31.5–35.7)
MCV RBC AUTO: 87 FL (ref 79–97)
PLATELET # BLD AUTO: 477 10*3/MM3 (ref 140–450)
PMV BLD AUTO: 10.9 FL (ref 6–12)
POTASSIUM SERPL-SCNC: 3.9 MMOL/L (ref 3.5–5.2)
PROT SERPL-MCNC: 7.4 G/DL (ref 6–8.5)
RBC # BLD AUTO: 4.7 10*6/MM3 (ref 4.14–5.8)
SODIUM SERPL-SCNC: 141 MMOL/L (ref 136–145)
T4 FREE SERPL-MCNC: 1.59 NG/DL (ref 0.92–1.68)
TRIGL SERPL-MCNC: 97 MG/DL (ref 0–150)
TSH SERPL DL<=0.05 MIU/L-ACNC: 0.21 UIU/ML (ref 0.27–4.2)
VLDLC SERPL-MCNC: 17 MG/DL (ref 5–40)
WBC NRBC COR # BLD AUTO: 6.3 10*3/MM3 (ref 3.4–10.8)

## 2025-05-05 PROCEDURE — 80061 LIPID PANEL: CPT

## 2025-05-05 PROCEDURE — 80050 GENERAL HEALTH PANEL: CPT

## 2025-05-05 PROCEDURE — 84439 ASSAY OF FREE THYROXINE: CPT

## 2025-05-05 PROCEDURE — 82306 VITAMIN D 25 HYDROXY: CPT

## 2025-05-06 ENCOUNTER — OFFICE VISIT (OUTPATIENT)
Dept: INTERNAL MEDICINE | Facility: CLINIC | Age: 65
End: 2025-05-06
Payer: COMMERCIAL

## 2025-05-06 VITALS
OXYGEN SATURATION: 98 % | TEMPERATURE: 98.4 F | DIASTOLIC BLOOD PRESSURE: 80 MMHG | HEIGHT: 71 IN | SYSTOLIC BLOOD PRESSURE: 112 MMHG | WEIGHT: 143.8 LBS | HEART RATE: 100 BPM | BODY MASS INDEX: 20.13 KG/M2

## 2025-05-06 DIAGNOSIS — R07.89 RIGHT-SIDED CHEST WALL PAIN: ICD-10-CM

## 2025-05-06 DIAGNOSIS — F33.0 MILD EPISODE OF RECURRENT MAJOR DEPRESSIVE DISORDER: Chronic | ICD-10-CM

## 2025-05-06 DIAGNOSIS — E89.0 POSTSURGICAL HYPOTHYROIDISM: Chronic | ICD-10-CM

## 2025-05-06 DIAGNOSIS — Z23 NEED FOR TDAP VACCINATION: ICD-10-CM

## 2025-05-06 DIAGNOSIS — Z23 NEED FOR PNEUMOCOCCAL 20-VALENT CONJUGATE VACCINATION: ICD-10-CM

## 2025-05-06 DIAGNOSIS — Z00.00 WELL ADULT EXAM: Primary | ICD-10-CM

## 2025-05-06 DIAGNOSIS — C61 ADENOCARCINOMA OF PROSTATE: Chronic | ICD-10-CM

## 2025-05-06 DIAGNOSIS — D75.839 THROMBOCYTOSIS: ICD-10-CM

## 2025-05-06 RX ORDER — LEVOTHYROXINE SODIUM 75 UG/1
75 TABLET ORAL DAILY
Qty: 90 TABLET | Refills: 3 | Status: SHIPPED | OUTPATIENT
Start: 2025-05-06

## 2025-05-06 NOTE — PROGRESS NOTES
Chief Complaint  Salazar Alex is a 64 y.o. male presenting for Annual Exam and Hypothyroidism.     Relocated from [place], [place] to [place] 2021.  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  .  They have a daughter (born 1990) in [place].  They also have a son (born 1987) in [place], [place].    Patient has a past medical history of thyroid cancer (2010) status post thyroidectomy, mild hyperlipidemia, IBS, colon polyp, vitamin D deficiency, atrial fibrillation status post ablation, BPH and prostate cancer s/p laparoscopic prostatectomy 4/2021 c/b urethral stricture and mild incontinence.      History of Present Illness  Patient is here for annual physical and follow-up.    His wife is chronically ill and needs a lot of care, has had multiple hospital stays and follow-up visits, and needs her  around almost 24/7.  This is really wearing on him, he is feeling more down and depressed.  He is not able to get out much or do things that he would like.  If he is going more than 4 hours it becomes a challenge.  He has a history of depression years ago, did get therapy, but medications.  He is requesting referral for therapy at this time.    Otherwise he is improving from his influenza A infection, still has some cough lingering, also has some night sweats, but overall getting better.  He has been experiencing some pain of his right lower chest, worse with breathing or coughing.  This is also getting better.    Patient reports still having some difficulties with urination after his prostate surgery.  He has decreased flow, but no nocturia.  There was concern for possible UTI recently, but culture showed mixed brianne.  Patient reports feeling better after I gave him antibiotic in the hospital.  He has follow-up with urology Dr. Kd Marin.    Patient did lose a little bit of weight when he was sick, but no significant weight changes.   "He has not noticed feeling any jittery or having loose bowels.    He is very physically active, he jogs every 2 days, and the other days he does interval training on his stationary bike.  He sees a dentist twice yearly.    PHQ-9 Depression Screening  Little interest or pleasure in doing things? Several days   Feeling down, depressed, or hopeless? Several days   PHQ-2 Total Score 2   Trouble falling or staying asleep, or sleeping too much? Not at all   Feeling tired or having little energy? Not at all   Poor appetite or overeating? Not at all   Feeling bad about yourself - or that you are a failure or have let yourself or your family down? Not at all   Trouble concentrating on things, such as reading the newspaper or watching television? Not at all   Moving or speaking so slowly that other people could have noticed? Or the opposite - being so fidgety or restless that you have been moving around a lot more than usual? Not at all     Thoughts that you would be better off dead, or of hurting yourself in some way? Not at all   PHQ-9 Total Score 2   If you checked off any problems, how difficult have these problems made it for you to do your work, take care of things at home, or get along with other people? Somewhat difficult           The following portions of the patient's history were reviewed and updated as appropriate: allergies, current medications, past family history, past medical history, past social history, past surgical history, and problem list.    Objective  /80 (BP Location: Left arm, Patient Position: Sitting, Cuff Size: Adult)   Pulse 100   Temp 98.4 °F (36.9 °C) (Infrared)   Ht 180.3 cm (70.98\")   Wt 65.2 kg (143 lb 12.8 oz)   SpO2 98%   BMI 20.07 kg/m²     Physical Exam  Vitals reviewed.   Constitutional:       Appearance: Normal appearance.   HENT:      Head: Normocephalic and atraumatic.      Right Ear: Tympanic membrane, ear canal and external ear normal. There is no impacted cerumen.     "  Left Ear: Tympanic membrane, ear canal and external ear normal. There is no impacted cerumen.      Nose: Nose normal. No congestion.      Mouth/Throat:      Mouth: Mucous membranes are moist.      Pharynx: Oropharynx is clear.   Eyes:      Extraocular Movements: Extraocular movements intact.      Conjunctiva/sclera: Conjunctivae normal.   Cardiovascular:      Rate and Rhythm: Normal rate and regular rhythm.      Heart sounds: Normal heart sounds. No murmur heard.  Pulmonary:      Effort: Pulmonary effort is normal.      Breath sounds: Normal breath sounds.   Chest:       Abdominal:      General: There is no distension.      Palpations: Abdomen is soft. There is no mass.      Tenderness: There is no abdominal tenderness.   Musculoskeletal:      Cervical back: Neck supple. No tenderness.      Right lower leg: No edema.      Left lower leg: No edema.   Lymphadenopathy:      Cervical: No cervical adenopathy.   Skin:     General: Skin is warm and dry.   Neurological:      Mental Status: He is alert and oriented to person, place, and time. Mental status is at baseline.   Psychiatric:         Behavior: Behavior normal.         Thought Content: Thought content normal.         Assessment/Plan   1. Well adult exam  Counseled on recommendations for tetanus/Tdap vaccine with whooping cough every 10 years, pneumonia vaccine and COVID booster.  Patient would like Tdap and pneumococcus vaccine today.  Counseled recommendations for regular exercise, patient by far exceeds basic recommendations of 2.5 hours weekly.  Counseled recommendation for regular dental visits, patient goes twice yearly.    2. Mild episode of recurrent major depressive disorder  PHQ-9 Total Score: 2  I think much is situational, especially being primary caregiver for his wife.  He is requesting referral for therapy, and I believe this would be helpful for him.  He is not interested in medication therapyat this time.  I have offered him follow-up with me, but  for now we will follow-up with behavioral health for therapy.  - Ambulatory Referral to Behavioral Health    3. Right-sided chest wall pain  Likely sprain.  Chest x-ray reassuring.  Symptoms improving.  Could try ibuprofen for symptom relief, otherwise give this some time    4. Thrombocytosis  Suspect  secondary after recent infection.  Will recheck blood work in a couple of months  - CBC (No Diff); Future    5. Postsurgical hypothyroidism  Thyroid is not well-controlled.  Will decrease dose of Synthroid from 88 mcg daily to 75 mcg daily.  Recheck in about 2 months  - levothyroxine (SYNTHROID, LEVOTHROID) 75 MCG tablet; Take 1 tablet by mouth Daily.  Dispense: 90 tablet; Refill: 3  - TSH; Future  - T4, Free; Future    6. Need for pneumococcal 20-valent conjugate vaccination  Administered today  - Pneumococcal Conjugate Vaccine 20-Valent All    7. Need for Tdap vaccination  Administered today  - Tdap Vaccine Greater Than or Equal To 6yo IM    8. Adenocarcinoma of prostate  In remission.  Still still having some urinary symptoms/LUTS.  Continue follow-up with urology    BMI is within normal parameters. No other follow-up for BMI required.    Return in about 1 year (around 5/6/2026), or if symptoms worsen or fail to improve, for Annual physical.    Future Appointments         Provider Department Center    5/6/2026 10:15 AM (Arrive by 10:00 AM) Olegario Lam MD [facility] INTERNAL MEDICINE [facility]              Olegario Lam MD  Family Medicine  05/06/2025

## 2025-05-06 NOTE — LETTER
[facility]  Vaccine Consent Form    Patient Name:  Salazar Alex  Patient :  1960  MRN: [id number]    Vaccine(s) Ordered    Tdap Vaccine Greater Than or Equal To 6yo IM  Pneumococcal Conjugate Vaccine 20-Valent All        Screening Checklist  The following questions should be completed prior to vaccination. If you answer “yes” to any question, it does not necessarily mean you should not be vaccinated. It just means we may need to clarify or ask more questions. If a question is unclear, please ask your healthcare provider to explain it.    Yes No   Any fever or moderate to severe illness today (mild illness and/or antibiotic treatment are not contraindications)?     Do you have a history of a serious reaction to any previous vaccinations, such as anaphylaxis, encephalopathy within 7 days, Guillain-Buffalo syndrome within 6 weeks, seizure?     Have you received any live vaccine(s) (e.g MMR, PANCHO) or any other vaccines in the last month (to ensure duplicate doses aren't given)?     Do you have an anaphylactic allergy to latex (DTaP, DTaP-IPV, Hep A, Hep B, MenB, RV, Td, Tdap), baker’s yeast (Hep B, HPV), polysorbates (RSV, nirsevimab, PCV 20, Rotavirrus, Tdap, Shingrix), or gelatin (PANCHO, MMR)?     Do you have an anaphylactic allergy to neomycin (Rabies, PANCHO, MMR, IPV, Hep A), polymyxin B (IPV), or streptomycin (IPV)?      Any cancer, leukemia, AIDS, or other immune system disorder? (PANCHO, MMR, RV)     Do you have a parent, brother, or sister with an immune system problem (if immune competence of vaccine recipient clinically verified, can proceed)? (MMR, PANCHO)     Any recent steroid treatments for >2 weeks, chemotherapy, or radiation treatment? (PANCHO, MMR)     Have you received antibody-containing blood transfusions or IVIG in the past 11 months (recommended interval is dependent on product)? (MMR, PANCHO)     Have you taken antiviral drugs (acyclovir, famciclovir, valacyclovir for PANCHO) in the last 24 or 48 hours,  "respectively?      Are you pregnant or planning to become pregnant within 1 month? (PANCHO, MMR, HPV, IPV, MenB, Abrexvy; For Hep B- refer to Engerix-B; For RSV - Abrysvo is indicated for 32-36 weeks of pregnancy from September to January)     For infants, have you ever been told your child has had intussusception or a medical emergency involving obstruction of the intestine (Rotavirus)? If not for an infant, can skip this question.         *Ordering Physicians/APC should be consulted if \"yes\" is checked by the patient or guardian above.  I have received, read, and understand the Vaccine Information Statement (VIS) for each vaccine ordered.  I have considered my or my child's health status as well as the health status of my close contacts.  I have taken the opportunity to discuss my vaccine questions with my or my child's health care provider.   I have requested that the ordered vaccine(s) be given to me or my child.  I understand the benefits and risks of the vaccines.  I understand that I should remain in the clinic for 15 minutes after receiving the vaccine(s).  _________________________________________________________  Signature of Patient or Parent/Legal Guardian ____________________  Date   "

## 2025-06-23 DIAGNOSIS — E89.0 POSTSURGICAL HYPOTHYROIDISM: Chronic | ICD-10-CM

## 2025-06-23 RX ORDER — LEVOTHYROXINE SODIUM 75 UG/1
75 TABLET ORAL DAILY
Qty: 90 TABLET | Refills: 0 | Status: SHIPPED | OUTPATIENT
Start: 2025-06-23

## 2025-06-23 NOTE — TELEPHONE ENCOUNTER
"Caller: Salazar Alex \"Serafin\"    Relationship: Self    Best call back number: [phone]     Requested Prescriptions:   Requested Prescriptions     Pending Prescriptions Disp Refills    levothyroxine (SYNTHROID, LEVOTHROID) 75 MCG tablet 90 tablet 3     Sig: Take 1 tablet by mouth Daily.        Pharmacy where request should be sent: [facility] [address] [phone] [facility] [phone] [facility]     Last office visit with prescribing clinician: 5/6/2025   Last telemedicine visit with prescribing clinician: Visit date not found   Next office visit with prescribing clinician: 5/6/2026     Additional details provided by patient: OUT OF MEDS     "

## 2025-06-23 NOTE — TELEPHONE ENCOUNTER
Rx Refill Note  Requested Prescriptions     Pending Prescriptions Disp Refills    levothyroxine (SYNTHROID, LEVOTHROID) 75 MCG tablet 90 tablet 3     Sig: Take 1 tablet by mouth Daily.      Last office visit with prescribing clinician: 5/6/2025   Last telemedicine visit with prescribing clinician: Visit date not found   Next office visit with prescribing clinician: 5/6/2026                         Would you like a call back once the refill request has been completed: [] Yes [] No    If the office needs to give you a call back, can they leave a voicemail: [] Yes [] No    Isela Gordon LPN  06/23/25, 09:49 EDT

## 2025-08-12 ENCOUNTER — OFFICE VISIT (OUTPATIENT)
Age: 65
End: 2025-08-12
Payer: COMMERCIAL

## 2025-08-12 DIAGNOSIS — F43.23 ADJUSTMENT DISORDER WITH MIXED ANXIETY AND DEPRESSED MOOD: Primary | ICD-10-CM

## 2025-08-12 PROCEDURE — 90791 PSYCH DIAGNOSTIC EVALUATION: CPT

## 2025-08-27 ENCOUNTER — OFFICE VISIT (OUTPATIENT)
Age: 65
End: 2025-08-27
Payer: COMMERCIAL

## 2025-08-27 DIAGNOSIS — F43.23 ADJUSTMENT DISORDER WITH MIXED ANXIETY AND DEPRESSED MOOD: Primary | ICD-10-CM
